# Patient Record
Sex: FEMALE | Race: ASIAN | Employment: FULL TIME | URBAN - METROPOLITAN AREA
[De-identification: names, ages, dates, MRNs, and addresses within clinical notes are randomized per-mention and may not be internally consistent; named-entity substitution may affect disease eponyms.]

---

## 2024-08-08 ENCOUNTER — HOSPITAL ENCOUNTER (OUTPATIENT)
Dept: GENERAL RADIOLOGY | Age: 59
Discharge: HOME | End: 2024-08-08
Attending: INTERNAL MEDICINE
Payer: COMMERCIAL

## 2024-08-08 ENCOUNTER — LAB ENCOUNTER (OUTPATIENT)
Dept: LAB | Age: 59
End: 2024-08-08
Attending: INTERNAL MEDICINE
Payer: COMMERCIAL

## 2024-08-08 ENCOUNTER — OFFICE VISIT (OUTPATIENT)
Dept: RHEUMATOLOGY | Facility: CLINIC | Age: 59
End: 2024-08-08
Payer: COMMERCIAL

## 2024-08-08 ENCOUNTER — TELEPHONE (OUTPATIENT)
Dept: RHEUMATOLOGY | Facility: CLINIC | Age: 59
End: 2024-08-08

## 2024-08-08 VITALS
WEIGHT: 133 LBS | DIASTOLIC BLOOD PRESSURE: 80 MMHG | SYSTOLIC BLOOD PRESSURE: 112 MMHG | BODY MASS INDEX: 23.27 KG/M2 | HEART RATE: 98 BPM | HEIGHT: 63.5 IN | TEMPERATURE: 98 F | OXYGEN SATURATION: 98 % | RESPIRATION RATE: 16 BRPM

## 2024-08-08 DIAGNOSIS — M1A.00X0 GOUTY ARTHROPATHY, CHRONIC, WITHOUT TOPHI: ICD-10-CM

## 2024-08-08 DIAGNOSIS — M1A.00X0 GOUTY ARTHROPATHY, CHRONIC, WITHOUT TOPHI: Primary | ICD-10-CM

## 2024-08-08 DIAGNOSIS — M15.0 PRIMARY OSTEOARTHRITIS INVOLVING MULTIPLE JOINTS: ICD-10-CM

## 2024-08-08 LAB
ALBUMIN SERPL-MCNC: 4.3 G/DL (ref 3.2–4.8)
ALBUMIN/GLOB SERPL: 1.2 {RATIO} (ref 1–2)
ALP LIVER SERPL-CCNC: 66 U/L
ALT SERPL-CCNC: 15 U/L
ANION GAP SERPL CALC-SCNC: 4 MMOL/L (ref 0–18)
AST SERPL-CCNC: 19 U/L (ref ?–34)
BILIRUB SERPL-MCNC: 0.5 MG/DL (ref 0.3–1.2)
BILIRUB UR QL STRIP.AUTO: NEGATIVE
BUN BLD-MCNC: 12 MG/DL (ref 9–23)
CALCIUM BLD-MCNC: 9.7 MG/DL (ref 8.7–10.4)
CHLORIDE SERPL-SCNC: 105 MMOL/L (ref 98–112)
CO2 SERPL-SCNC: 27 MMOL/L (ref 21–32)
COLOR UR AUTO: YELLOW
CREAT BLD-MCNC: 0.93 MG/DL
CRP SERPL-MCNC: 3.7 MG/DL (ref ?–0.5)
EGFRCR SERPLBLD CKD-EPI 2021: 71 ML/MIN/1.73M2 (ref 60–?)
ERYTHROCYTE [SEDIMENTATION RATE] IN BLOOD: 108 MM/HR
FASTING STATUS PATIENT QL REPORTED: NO
GLOBULIN PLAS-MCNC: 3.7 G/DL (ref 2–3.5)
GLUCOSE BLD-MCNC: 117 MG/DL (ref 70–99)
GLUCOSE UR STRIP.AUTO-MCNC: NORMAL MG/DL
IGA SERPL-MCNC: 362.3 MG/DL (ref 40–350)
IGM SERPL-MCNC: 89.4 MG/DL (ref 50–300)
IMMUNOGLOBULIN PNL SER-MCNC: 1696 MG/DL (ref 650–1600)
KETONES UR STRIP.AUTO-MCNC: NEGATIVE MG/DL
LEUKOCYTE ESTERASE UR QL STRIP.AUTO: 75
NITRITE UR QL STRIP.AUTO: NEGATIVE
OSMOLALITY SERPL CALC.SUM OF ELEC: 283 MOSM/KG (ref 275–295)
PH UR STRIP.AUTO: 5.5 [PH] (ref 5–8)
POTASSIUM SERPL-SCNC: 3.4 MMOL/L (ref 3.5–5.1)
PROT SERPL-MCNC: 8 G/DL (ref 5.7–8.2)
PROT UR STRIP.AUTO-MCNC: 20 MG/DL
RBC #/AREA URNS AUTO: >10 /HPF
RBC UR QL AUTO: NEGATIVE
RHEUMATOID FACT SERPL-ACNC: <3.5 IU/ML (ref ?–14)
SODIUM SERPL-SCNC: 136 MMOL/L (ref 136–145)
SP GR UR STRIP.AUTO: 1.03 (ref 1–1.03)
T4 FREE SERPL-MCNC: 1.8 NG/DL (ref 0.8–1.7)
TSI SER-ACNC: 0.4 MIU/ML (ref 0.55–4.78)
URATE SERPL-MCNC: 6.7 MG/DL
UROBILINOGEN UR STRIP.AUTO-MCNC: NORMAL MG/DL
VIT B12 SERPL-MCNC: 866 PG/ML (ref 211–911)

## 2024-08-08 PROCEDURE — 83521 IG LIGHT CHAINS FREE EACH: CPT

## 2024-08-08 PROCEDURE — 86225 DNA ANTIBODY NATIVE: CPT

## 2024-08-08 PROCEDURE — 73560 X-RAY EXAM OF KNEE 1 OR 2: CPT | Performed by: INTERNAL MEDICINE

## 2024-08-08 PROCEDURE — 73130 X-RAY EXAM OF HAND: CPT | Performed by: INTERNAL MEDICINE

## 2024-08-08 PROCEDURE — 73630 X-RAY EXAM OF FOOT: CPT | Performed by: INTERNAL MEDICINE

## 2024-08-08 PROCEDURE — 87522 HEPATITIS C REVRS TRNSCRPJ: CPT

## 2024-08-08 PROCEDURE — 84439 ASSAY OF FREE THYROXINE: CPT

## 2024-08-08 PROCEDURE — 87086 URINE CULTURE/COLONY COUNT: CPT

## 2024-08-08 PROCEDURE — 85652 RBC SED RATE AUTOMATED: CPT

## 2024-08-08 PROCEDURE — 84550 ASSAY OF BLOOD/URIC ACID: CPT

## 2024-08-08 PROCEDURE — 81001 URINALYSIS AUTO W/SCOPE: CPT

## 2024-08-08 PROCEDURE — 3079F DIAST BP 80-89 MM HG: CPT | Performed by: INTERNAL MEDICINE

## 2024-08-08 PROCEDURE — 86334 IMMUNOFIX E-PHORESIS SERUM: CPT

## 2024-08-08 PROCEDURE — 84443 ASSAY THYROID STIM HORMONE: CPT

## 2024-08-08 PROCEDURE — 86235 NUCLEAR ANTIGEN ANTIBODY: CPT

## 2024-08-08 PROCEDURE — 80053 COMPREHEN METABOLIC PANEL: CPT

## 2024-08-08 PROCEDURE — 84165 PROTEIN E-PHORESIS SERUM: CPT

## 2024-08-08 PROCEDURE — 3074F SYST BP LT 130 MM HG: CPT | Performed by: INTERNAL MEDICINE

## 2024-08-08 PROCEDURE — 86140 C-REACTIVE PROTEIN: CPT

## 2024-08-08 PROCEDURE — 87077 CULTURE AEROBIC IDENTIFY: CPT

## 2024-08-08 PROCEDURE — 3008F BODY MASS INDEX DOCD: CPT | Performed by: INTERNAL MEDICINE

## 2024-08-08 PROCEDURE — 86431 RHEUMATOID FACTOR QUANT: CPT

## 2024-08-08 PROCEDURE — 82784 ASSAY IGA/IGD/IGG/IGM EACH: CPT

## 2024-08-08 PROCEDURE — 36415 COLL VENOUS BLD VENIPUNCTURE: CPT

## 2024-08-08 PROCEDURE — 82607 VITAMIN B-12: CPT

## 2024-08-08 PROCEDURE — 87186 SC STD MICRODIL/AGAR DIL: CPT

## 2024-08-08 PROCEDURE — 99205 OFFICE O/P NEW HI 60 MIN: CPT | Performed by: INTERNAL MEDICINE

## 2024-08-08 RX ORDER — METHYLPREDNISOLONE 4 MG/1
TABLET ORAL
Qty: 1 EACH | Refills: 0 | Status: SHIPPED | OUTPATIENT
Start: 2024-08-08

## 2024-08-08 RX ORDER — ALLOPURINOL 100 MG/1
100 TABLET ORAL DAILY
COMMUNITY
Start: 2023-04-14

## 2024-08-08 RX ORDER — BRINZOLAMIDE/BRIMONIDINE TARTRATE 10; 2 MG/ML; MG/ML
1 SUSPENSION/ DROPS OPHTHALMIC 2 TIMES DAILY
COMMUNITY
Start: 2024-03-27

## 2024-08-08 RX ORDER — BIMATOPROST 0.3 MG/ML
SOLUTION/ DROPS OPHTHALMIC NIGHTLY
COMMUNITY
Start: 2024-05-03

## 2024-08-08 RX ORDER — AMLODIPINE BESYLATE 5 MG/1
5 TABLET ORAL DAILY
COMMUNITY

## 2024-08-08 RX ORDER — ALLOPURINOL 100 MG/1
100 TABLET ORAL DAILY
Qty: 30 TABLET | Refills: 1 | Status: SHIPPED | OUTPATIENT
Start: 2024-08-08

## 2024-08-08 RX ORDER — OLMESARTAN MEDOXOMIL 20 MG/1
20 TABLET ORAL DAILY
COMMUNITY

## 2024-08-08 NOTE — PROGRESS NOTES
[facility], [address] [place] [address]      Consult     Talon Telles Patient Status:  No patient class for patient encounter    1965 MRN [id number]   Location [facility], [address] [place] [address] Attending No att. providers found   Hosp Day # 0 PCP Kenneth Pabon MD     Referring Provider: PCP    Reason for Consultation:   Component  Ref Range & Units 22  9:39 AM   Uric Acid  2.3 - 6.6 mg/dL 8.8 High    Comment: Patients treated with          Subjective:      Talon Telles is a 58 year old female with around 15-20 years ago noticed slow progression of arthritic changes tips of DIP joints; stiffness pain     Has occasional swelling of hands (over use)     Patient was started on allopurinol 100mg 2 years ago but does not take every only with flareups.    Had recent swelling right ankle and alternates with left ankle. (Now consistently for 2 doses)    No colchicine; no oral steroids     Denies any pain in her shoulders, elbows , wrists or hands    Has issues with right knee pain (saw ortho and did cortisone shot; 2019)     2 weeks ago ; Had colonoscopy had perforation (polyp)     Colon perforation ([facility]) Dr Wells (did the procedure)     Dr Epstein did surgeon (took out some colon) and some appendix (ascending colon poly removed no cancer)     Buts Gejers syndrome (yearly colonscopy)     Occasional low back; hip pain neck    Denies any history of DVT PE TIA CVA seizures migraines or headaches    States no shortness of breath ,chest pain ,fevers ,chills    States no urinary or bowel symptoms; bloody stools    States no headaches jaw pain, vision changes    States no history of pericardial, pleural effusions    States no significant dry eyes or dry mouth (mild)     States no history of uveitis iritis scleritis    States no history of Raynaud's or digital ulcerations    States no major weight changes; night sweats    Her weight has been stable    States no history of  photosensitive or malar rash.    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     Takes NSAIDS sometimes now just tylenol ; supportive ; 25 year old daughter stays with them. (Working from home)       History/Other:      Past Medical History:History reviewed. No pertinent past medical history.     Past Surgical History:   Past Surgical History:   Procedure Laterality Date    Excis tendon sheath lesn,hand/fingr Left 11/10/2015    Procedure: EXCISION MASS/CYST  FINGER;  Surgeon: Jesus Myles MD;  Location: [facility]    Revise median n/carpal tunnel surg Left 11/10/2015    Procedure: CARPAL TUNNEL RELEASE;  Surgeon: Jesus Myles MD;  Location: [facility]       Social History:  reports that she has never smoked. She has never used smokeless tobacco. She reports that she does not currently use alcohol. She reports that she does not use drugs.    Family History: History reviewed. No pertinent family history.    Allergies:   Allergies   Allergen Reactions    Hydrocodone      Side effect only, causes nausea pt prefers not to have.       Current Medications:  Current Outpatient Medications   Medication Sig Dispense Refill    amLODIPine 5 MG Oral Tab Take 1 tablet (5 mg total) by mouth daily.      Bimatoprost 0.03 % Ophthalmic Solution Place into both eyes nightly.      SIMBRINZA 1-0.2 % Ophthalmic Suspension 1 drop by Each eye route 2 (two) times daily.      olmesartan 20 MG Oral Tab Take 1 tablet (20 mg total) by mouth daily.      allopurinol 100 MG Oral Tab Take 1 tablet (100 mg total) by mouth daily.      allopurinol 100 MG Oral Tab Take 1 tablet (100 mg total) by mouth daily. 30 tablet 1    methylPREDNISolone (MEDROL) 4 MG Oral Tablet Therapy Pack As directed. 1 each 0    Metoprolol Succinate ER (TOPROL XL) 100 MG Oral Tablet 24 Hr Take 1 tablet (100 mg total) by mouth  daily.      B Complex-C (SUPER B COMPLEX OR) Take  by mouth.      MELATONIN OR Take  by mouth.      Ergocalciferol (VITAMIN D OR) Take  by mouth.        No current facility-administered medications for this visit.     (Not in a hospital admission)      Review of Systems:     Constitutional: Negative for chills, , fatigue, fever and unexpected weight change.    HENT: Negative for congestion, and mouth sores.    Eyes: Negative for photophobia, pain, redness and visual disturbance.    Respiratory: Negative for apnea, cough, chest tightness, shortness of breath, wheezing and stridor.    Cardiovascular: Negative for chest pain, palpitations and leg swelling.    Gastrointestinal: Negative for abdominal distention, abdominal pain, blood in stool, constipation, diarrhea and nausea.    Endocrine: Negative.     Genitourinary: Negative for decreased urine volume, difficulty urinating, dyspareunia, dysuria, flank pain, and frequency.    Musculoskeletal: + arthralgias, no gait problem and joint swelling.    Skin: Negative for color change, pallor and rash. No raynauds or digital ulcerations no sclerodactly.    Allergic/Immunologic: Negative.    Neurological: Negative for dizziness, tremors, seizures, syncope, speech difficulty, weakness, light-headedness, numbness and headaches.    Hematological: Does not bruise/bleed easily.    Psychiatric/Behavioral: Negative for confusion, decreased concentration, hallucinations, self-injury, +sleep disturbance and no suicidal ideas or depression.    Objective:   Vitals:    08/08/24 1116   BP: 112/80   Pulse: 98   Resp: 16   Temp: 98.4 °F (36.9 °C)          Constitutional: is oriented to person, place, and time. Appears well-developed and well-nourished. No distress.    HEENT: Normocephalic; EOMI; no jvd; no LAD; no oral or nasal ulcers.     Eyes: Conjunctivae and EOM are normal. Pupils are equal, round, and reactive to light.     Neck: Normal range of motion. No thyromegaly  present.    Cardiovascular: RRR, no murmurs.    Lungs: Clear, Bilateral air entry, no wheezes.    Abdominal: Soft.    Musculoskeletal:         Joint Exam 08/08/2024        Right  Left   Knee   Tender   Tender   Ankle  Swollen Tender      Subtalar  Swollen Tender      Tarsometatarsal  Swollen Tender      MTP 1  Swollen       MTP 2  Swollen       MTP 3  Swollen       MTP 4  Swollen            Swollen: 7      Tender: 5          Right shoulder: Exhibits normal range of motion on abduction and internal rotation, no tenderness, no bony tenderness, no deformity, no laceration, no pain and no spasm.        Left shoulder: Exhibits normal range of motion on abduction and internal and external rotation.  no tenderness, no bony tenderness, no swelling, no effusion, no deformity, no pain, no spasm and normal strength.        Right elbow:  Exhibits normal range of motion, no swelling, no effusion and no deformity. No tenderness found. No medial epicondyle, no lateral epicondyle and no olecranon process tenderness noted. There are no contractures or tophi or nodules.        Left elbow:  Normal range of motion, no swelling, no effusion and no deformity. No medial epicondyle, no lateral epicondyle and no olecranon process tenderness noted. There are no contractures or tophi or nodules.        Right wrist:  Exhibits normal range of motion, no tenderness, no bony tenderness, no swelling, no effusion and no crepitus. Flexion and extension intact w/o limitation.        Left wrist: Exhibits normal range of motion, no tenderness, no bony tenderness, no swelling, no effusion, no crepitus and no deformity. Flexion and extension intact without limitation.        Right hip: Exhibits normal range of motion, normal strength, no tenderness, no bony tenderness, no swelling and no crepitus.        Right hand: No synovitis of MCP,PIP or DIP joints; there are small Bouchards extensive scattered enlarged Heberden nodules noted flexion deformities;   strength: 100%.  Mild squaring first CMC joint        Left hand: No synovitis of MCP,PIP or DIP joints; no Bouchards extensive enlarged Heberden nodules noted with flexion deformities at the DIP joint;  strength: 100%.  Mild squaring of first CMC joint joint        Left hip: Exhibits normal range of motion, normal strength, no tenderness, no bony tenderness, No swelling and no crepitus.        Right knee: Exhibits normal range of motion, no swelling, no effusion, no ecchymosis, no deformity and no erythema. No tenderness found. No medial joint line, no lateral joint line, no MCL and no LCL tenderness noted. mod crepitation on flexion of knee and extension normal.        Left knee:  Exhibits normal range of motion, no swelling, no effusion, no ecchymosis and no erythema. No tenderness found. No medial joint line, no lateral joint line and no patellar tendon tenderness noted. mod crepitation on flexion of the knee. Extension intact and normal.        Right ankle: Moderate soft tissue swelling, mild warmth limitation range of motion flexion plantarflexion because of the swelling and edema no deformity. +tenderness.         Left ankle: Exhibits no swelling. No tenderness. No lateral malleolus and no medial malleolus tenderness found. Achilles tendon normal. Achilles tendon exhibits no pain, no defect and normal Renteria's test results.  Dorsiflexion and plantar flexion intact without limitation in range of motion.        Cervical back: Exhibits normal range of motion, no tenderness, no bony tenderness, no swelling, no pain and no spasm.        Thoracic back: Exhibits normal range of motion, no tenderness, no bony tenderness and no spasm.        Lumbar back:  Exhibits normal range of motion, no tenderness, no bony tenderness, no pain and no spasm.        Right foot: normal. There is normal range of motion, no tenderness, no bony tenderness, no crepitus and no laceration. There is no synovitis or tenderness of  the MTP joints to palpation.  Bony enlargement of the MTP joints with mild soft tissue swelling        Left foot: normal. There is normal range of motion, no tenderness, no bony tenderness and no crepitus. There is no synovitis or tenderness of the MTP joints to palpation.  Bony enlargement of the MTP joints.  Bony enlargement MTP joints    Lymphadenopathy: No submental, no submandibular, and no occipital adenopathy present, has no cervical adenopathy or axillary lympadenopathy.    Neurological: Alert and oriented. No focal motor or sensory abnormalities. Strength is 5/5 Upper Extremities/Lower Extremities proximally and distally.    Skin: Skin is warm, dry and intact.  No rashes no purpuric petechial lesion    Psychiatric: Normal behavior.    Results:    Labs:      No results found for: \"WBC\", \"RBC\", \"HGB\", \"HCT\", \"MCV\", \"MCH\", \"MCHC\", \"RDW\", \"PLT\", \"MPV\", \"LYMPHOCYTES\", \"NEUT\"    No components found for: \"RELY\", \"NMET\", \"MYEL\", \"PROMY\", \"MARYAM\", \"ABSNEUTS\", \"ABSBANDS\", \"ABMM\", \"ABMY\", \"ABPM\", \"ABBL\"      No results found for: \"NA\", \"K\", \"CHLORIDE\", \"CO2\", \"BUN\", \"CREAT\", \"GFR\", \"ALB\", \"ALKPHOS\", \"AST\", \"ALT\"       No components found for: \"ESRWESTERGRN\"       No results found for: \"CRP\"      No results found for: \"COLOR\", \"CLARITY\", \"UROBILINOGEN\", \"YEAST\"  @LABRCNTIP(RF,B12)@      [unfilled]    Imaging:  No results found.    Anatomical Region Laterality Modality   Lower Extremities right Digital Radiography   Knee -- --     Narrative    CLINICAL INDICATION: PAIN IN RT KNEE  COMPARISON: None  TECHNIQUE: Bilateral AP standing, PA skiers, and patellar sundown view of the  knees were obtained. A single lateral view of the right knee was also performed  FINDINGS:  Right knee: There is no acute fracture. No dislocation. Alignment is  satisfactory. There is prominent bony spurring along the superior margin of the  patellar facet. Otherwise Minimal bony spurring most prominent in the medial  compartment. Cortex is  intact. Patella is not subluxed. Small suprapatellar  joint effusion. No intramedullary abnormality. Lateral patellofemoral joint  space narrowing and bony spurring is also present.  Left knee: There is no acute fracture. No dislocation. Minimal bony spurring  most prominent in the medial compartment. Cortex is intact. No intramedullary  abnormality. Bony spurring along the lateral patellar facet.  IMPRESSION:  1.  No acute osseous abnormality within bilateral knees.  2.  Mild degenerative changes within the right knee and minimal degenerative  changes of the left knee.  Dictating Vivian Holly  Dictated 2022 14:45  Signing Vivian Holly  Left ankle 3 views and left foot 3 views.  HISTORY: Left foot and ankle pain. M 25.572.  Opinion:  No acute appearing fracture or bony lesion. No dislocation. Spurs are seen at  the plantar and posterior aspect of the calcaneus. Hallux valgus deformity is  noted with mild spurring and joint space loss at the MP joint of the great toe.  Dictating Quan Bray  Dictated 2021 15:07  Signing Quan Bray  [place] [address]                      Transcribed by:  ZULEMA                                       21 15:09  Signed by:  QUAN ACOSTA MD                          21 15:09                                     [facility] [address] [place], [place]    NAME: VINH GLEZ/AGE/SEX: 1965 - 53 - F    PHYSICIAN: Kenneth Pabon M.D.                                    ADMIT DATE:    UNIT #: [id number]                                                DIS DATE:    ACCT #: [id number]                                              LOC/RM/BED: D0O-                                                [facility]                                      RAD/XR  BONE DENSITY : [id number]                                           ORDER DATE: 03/15/19                                           REPORT # : [id number]    Exam: Bone Densitometry        Indication: Postmenopausal screening.        Comparison: None.        Technical Factors: Utilizing dual-isotope imaging technique the spine and hip were evaluated.        Findings:  On AP projection of the lumbar spine the bone mineral density is 1.082g/sq cm  corresponding with a T-score value of 0.3. This is within normal limits and there is no increased  fracture risk.        In the femoral neck the bone mineral density is 0.792 g/sq cm corresponding with a T-score value of  -0.5. This is also within normal limits and there is no increased fracture risk.        Impression:        Bone mineral density is within normal limits and there is no increased fracture risk.        DICTATED: Wei Marx M.D.      <Electronically signed by Wei Marx M.D. in OV>          Wei Marx M.D.          03/15/19 1540    DRAFT UNTIL SIGNED     AMINATION: MRI of the right foot without IV contrast   CLINICAL INDICATION: Suspect fifth metatarsal stress fracture   COMPARISON: 12/19/2018 left foot radiograph   MRI of the right foot was performed with sagittal T1 and STIR sequences and   axial and coronal T2 fat-saturated and T1-weighted sequences.   FINDINGS: There is bone marrow edema surrounding the nondisplaced,   intra-articular fracture of the lateral aspect of the second metatarsal base.   There is soft tissue edema in the Lisfranc joint; however, the Lisfranc ligament   is not disrupted. There is surrounding soft tissue edema. The flexor and   extensor tendons are intact. The abductor hallucis and abductor digit minimi   tendons are intact.   There are bone marrow contusions in the third and fourth metatarsal bases as   well as the intermediate and lateral cuneiforms.   IMPRESSION: Bone marrow contusions along the second through fourth    tarsometatarsal articulation and nondisplaced intra-articular fracture of the   second metatarsal base. Correlation with CT scan would be helpful for further   evaluation.   Dictating Ramy Pierre   Dictated 12/31/2018 16:48   Signing Ramy Pierre   Location [place] [address]                          Component  Ref Range & Units 4/4/24 10:06 AM   Sodium  133 - 144 mEq/L 140   Potassium  3.5 - 5.2 mEq/L 4.0   Chloride  98 - 107 mEq/L 105   Glucose  70 - 99 mg/dL 109 High    BUN  7 - 25 mg/dL 14   Creatinine  0.6 - 1.2 mg/dL 0.8   Calcium  8.6 - 10.3 mg/dL 9.1   Total Protein  6.4 - 8.9 g/dL 6.9   Albumin  3.5 - 5.7 g/dL 3.9   Total Bilirubin  0.3 - 1.0 mg/dL 1.0   Alkaline Phosphatase  34 - 104 U/L 60   AST  13 - 39 U/L 12 Low    CO2  21.0 - 31 mEq/L 26.3   ALT  7 - 52 U/L 12   Anion Gap  6.2 - 14.7 mEq/L 8.7   Resulting Agency [facility]     ef Range & Units 4/4/24 10:06 AM   Vit D, 25-Hydroxy  30.0 - 100 ng/mL 43.8     ef Range & Units 12/1/23 11:36 AM   WBC  4.00 - 11 10*3/uL 6.20   RBC  3.63 - 5.0 10*6/uL 5.11 High    Hgb  12.0 - 15 g/dL 14.9   Hematocrit  34.7 - 45 % 46.3 High    MCV  80.0 - 100 fL 90.7   MCH  26.0 - 34 pg 29.1   MCHC  32.5 - 35 g/dL 32.1 Low    RDW  11.9 - 15 % 13.6   Platelets  150 - 450 10*3/uL 252   MPV  6.8 - 10.2 fL 8.8   Neutrophils %  43.0 - 82 % 51.7   Lymphocytes %  14.5 - 45 % 36.1   Monocytes %  4.3 - 13.3 % 6.6   Eosinophils %  0.1 - 6.8 % 4.8   Basophils %  0.0 - 2.0 % 0.9   ABSOLUTE NEUTROPHIL COUNT  1.7 - 7.7 10*3/uL 3.2   ABSOLUTE NEUTROPHIL COUNT  /uL 3,205   ABSOLUTE LYMPHOCYTES  0.6 - 3.4 10*3/uL 2.2   Monocytes Absolute  0.3 - 1.0 10*3/uL 0.4   Eosinophils Absolute  0.0 - 0.5 10*3/uL 0.3   Basophils Absolute  0.0 - 0.2 10*3/uL 0.1   Resulting Agency [facility]     Specimen Collected: 12/01/23 11:36 AM     go2 media Results Release      Assessment & Plan:      58-year-old woman comes in for further evaluation for likely gouty  arthritis right ankle swelling 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  Discussed the importance of remaining on allopurinol consistently for goal uric acid level less than 6  Consider colchicine but will try Medrol Dosepak first  Will see patient back in 4 weeks to repeat CMP uric acid levels and reevaluate the ankle.  If it is persistently swollen still we will proceed with an MRI hold for now    Patient agrees with this plan.  Will also get complete autoimmune workup and also get a new baseline DEXA scan    Discussed steroids and allopurinol discussed.    Education and counseling provided to patient.  Instructed patient to call my office or seek medical attention immediately if symptoms worsen. Risks and side effects of medications and diagnosis discussed in detail and patient was given written information on new prescribed medications.    Return to clinic:  Return in about 4 weeks (around 9/5/2024).    Nedra Delgado MD  8/8/2024

## 2024-08-08 NOTE — TELEPHONE ENCOUNTER
X-rays show changes of osteoarthritis and tendinitis of the Achilles.  We will follow-up with the labs and let her know if any other things come back concerning

## 2024-08-08 NOTE — PATIENT INSTRUCTIONS
OSTEOARTHRITIS    Fast Facts    Though some of the joint changes are irreversible, most patients will not need joint replacement surgery.    OA symptoms (what you feel) can vary greatly among patients.    A rheumatologist can detect arthritis and prescribe the proper treatment. The goal of treatment in OA is to reduce pain and improve function.    Exercise is an important part of OA treatment, because it can decrease joint pain and improve function.    At present, there is no treatment that can reverse the damage of OA in the joints. Researchers are trying to find ways to slow or reverse this joint damage.    Osteoarthritis (also known as OA) is a common joint disease that most often affects middle-age to elderly people. It is commonly referred to as \"wear and tear\" of the joints, but we now know that OA is a disease of the entire joint, involving the cartilage, joint lining, ligaments, and bone. Although it is more common in older people, it is not really accurate to say that the joints are just \"wearing out.\" It is characterized by breakdown of the cartilage (the tissue that cushions the ends of the bones between joints), bony changes of the joints, deterioration of tendons and ligaments, and various degrees of inflammation of the joint lining (called the synovium).    This arthritis tends to occur in the hand joints, spine, hips, knees, and great toes. The lifetime risk of developing OA of the knee is about 46%, and the lifetime risk of developing OA of the hip is 25%, according to the [facility] Osteoarthritis Project, a long-term study from the [facility] and sponsored by the Centers for Disease Control and Prevention (often called the CDC) and the National Institutes of Health.    OA is a top cause of disability in older people. The goal of osteoarthritis treatment is to reduce pain and improve function. There is no cure for the disease, but some treatments attempt to slow disease  progression.         What is osteoarthritis?    OA is a frequently slowly progressive joint disease typically seen in middle-aged to elderly people. In osteoarthritis, the cartilage between the bones in the joint breaks down. This causes the affected bones to slowly get bigger. The joint cartilage often breaks down because of mechanical stress or biochemical changes within the body, causing the bone underneath to fail. OA can occur together with other types of arthritis, such as gout or rheumatoid arthritis.    OA tends to affect commonly used joints such as the hands and spine, and the weight-bearing joints such as the hips and knees. Symptoms include:    Joint pain and stiffness    Knobby swelling at the joint    Cracking or grinding noise with joint movement    Decreased function of the joint    How do you treat osteoarthritis?    There is no proven treatment yet that can reverse joint damage from OA. The goal of osteoarthritis treatment is to reduce pain and improve function of the affected joints. Most often, this is possible with a mixture of physical measures and drug therapy and, sometimes, surgery.    Physical measures: Weight loss and exercise are useful in OA. Excess weight puts stress on your knee joints and hips and low back. For every 10 pounds of weight you lose over 10 years, you can reduce the chance of developing knee OA by up to 50 percent. Exercise can improve your muscle strength, decrease joint pain and stiffness, and lower the chance of disability due to OA. Also helpful are support (\"assistive\") devices, such as orthotics or a walking cane, that help you do daily activities. Heat or cold therapy can help relieve OA symptoms for a short time.    Certain alternative treatments such as spa (hot tub), massage, and chiropractic manipulation can help relieve pain for a short time. They can be costly, though, and require repeated treatments. Also, the long-term benefits of these alternative  (sometimes called complementary or integrative) medicine treatments are unproven but are under study.    Drug therapy: Forms of drug therapy include topical, oral (by mouth) and injections (shots). You apply topical drugs directly on the skin over the affected joints. These medicines include capsaicin cream, lidocaine and diclofenac gel. Oral pain relievers such as acetaminophen are common first treatments. So are nonsteroidal anti-inflammatory drugs (often called NSAIDs), which decrease swelling and pain.    In 2010, the government (FDA) approved the use of duloxetine (Cymbalta) for chronic (long-term) musculoskeletal pain including from OA. This oral drug is not new. It also is in use for other health concerns, such as mood disorders, nerve pain and fibromyalgia.    Patients with more serious pain may need stronger medications, such as prescription narcotics.    Joint injections with corticosteroids (sometimes called cortisone shots) or with a form of lubricant called hyaluronic acid can give months of pain relief from OA. This lubricant is given in the knee, and these shots may help delay the need for a knee replacement by a few years in some patients.    Surgery: Surgical treatment becomes an option for severe cases. This includes when the joint has serious damage, or when medical treatment fails to relieve pain and you have major loss of function. Surgery may involve arthroscopy, repair of the joint done through small incisions (cuts). If the joint damage cannot be repaired, you may need a joint replacement.    Supplements: Many over-the-counter nutrition supplements have been used for osteoarthritis treatment. Most lack good research data to support their effectiveness and safety. Among the most widely used are calcium, vitamin D and omega-3 fatty acids. To ensure safety and avoid drug interactions, consult your doctor or pharmacist before using any of these supplements. This is especially true when you are  combining these supplements with prescribed

## 2024-08-09 LAB
DSDNA IGG SERPL IA-ACNC: 1.1 IU/ML
ENA RNP IGG SER IA-ACNC: 1.9 U/ML
ENA SM IGG SER IA-ACNC: <0.7 U/ML
U1 SNRNP IGG SER IA-ACNC: 1.8 U/ML

## 2024-08-09 NOTE — TELEPHONE ENCOUNTER
Left detailed message on patient's voicemail regarding the below message:    X-rays show changes of osteoarthritis and tendinitis of the Achilles.  We will follow-up with the labs and let her know if any other things come back concerning     Patient was asked to call the office back with any questions she may have.

## 2024-08-12 ENCOUNTER — TELEPHONE (OUTPATIENT)
Dept: RHEUMATOLOGY | Facility: CLINIC | Age: 59
End: 2024-08-12

## 2024-08-12 DIAGNOSIS — N39.0 URINARY TRACT INFECTION WITHOUT HEMATURIA, SITE UNSPECIFIED: Primary | ICD-10-CM

## 2024-08-12 LAB
KAPPA LC FREE SER-MCNC: 3.96 MG/DL (ref 0.33–1.94)
KAPPA LC FREE/LAMBDA FREE SER NEPH: 1.18 {RATIO} (ref 0.26–1.65)
LAMBDA LC FREE SERPL-MCNC: 3.35 MG/DL (ref 0.57–2.63)

## 2024-08-12 RX ORDER — CIPROFLOXACIN 250 MG/1
250 TABLET, FILM COATED ORAL 2 TIMES DAILY
Qty: 10 TABLET | Refills: 0 | Status: SHIPPED | OUTPATIENT
Start: 2024-08-12 | End: 2024-08-17

## 2024-08-12 NOTE — TELEPHONE ENCOUNTER
Left  for pt. to call office and informed that Cipro has been ordered and sent to [facility].

## 2024-08-12 NOTE — TELEPHONE ENCOUNTER
0 Result Notes  URINE CULTURE 10,000 - 50,000 CFU/ML Escherichia coli Abnormal            Resulting Agency: [facility] ([facility])     Susceptibility     Escherichia coli     Not Specified     Ampicillin <=2 Sensitive     Cefazolin <=4 Sensitive     Ciprofloxacin <=0.25 Sensitive     Gentamicin <=1 Sensitive     Levofloxacin 1 Sensitive     Meropenem <=0.25 Sensitive     Nitrofurantoin <=16 Sensitive     Piperacillin + Tazobactam <=4 Sensitive     Trimethoprim/Sulfa <=20 Sensitive              Patient's urine culture is greater than 50,000 E. Coli    Could explain her inflammation levels being elevated along with her gout    It is sensitive to Cipro    Would suggest to 250 twice a day of Cipro for 5 days repeat urinalysis urine culture through PCP.  If no allergies    Uric acid levels at 6.7 likely higher because of gout flare same plan with allopurinol and steroid pack unfortunately likely completed already despite the infection    Her TSH is quite abnormal    She likely has underlying thyroid issue which needs to be worked up through her PCP    Or endocrinology if she has 1    Other autoimmune testing is so far negative.  Will let her know as other things come back concerning    Please fax these labs to PCP

## 2024-08-16 LAB
ALBUMIN SERPL ELPH-MCNC: 3.43 G/DL (ref 3.75–5.21)
ALBUMIN/GLOB SERPL: 0.91 {RATIO} (ref 1–2)
ALPHA1 GLOB SERPL ELPH-MCNC: 0.33 G/DL (ref 0.19–0.46)
ALPHA2 GLOB SERPL ELPH-MCNC: 0.91 G/DL (ref 0.48–1.05)
B-GLOBULIN SERPL ELPH-MCNC: 0.85 G/DL (ref 0.68–1.23)
GAMMA GLOB SERPL ELPH-MCNC: 1.68 G/DL (ref 0.62–1.7)
PROT SERPL-MCNC: 7.2 G/DL (ref 5.7–8.2)

## 2024-09-04 ENCOUNTER — OFFICE VISIT (OUTPATIENT)
Dept: RHEUMATOLOGY | Facility: CLINIC | Age: 59
End: 2024-09-04
Payer: COMMERCIAL

## 2024-09-04 VITALS
SYSTOLIC BLOOD PRESSURE: 122 MMHG | HEIGHT: 63.5 IN | BODY MASS INDEX: 23.97 KG/M2 | OXYGEN SATURATION: 98 % | DIASTOLIC BLOOD PRESSURE: 80 MMHG | TEMPERATURE: 98 F | HEART RATE: 91 BPM | WEIGHT: 137 LBS | RESPIRATION RATE: 16 BRPM

## 2024-09-04 DIAGNOSIS — M1A.00X0 GOUTY ARTHROPATHY, CHRONIC, WITHOUT TOPHI: Primary | ICD-10-CM

## 2024-09-04 DIAGNOSIS — M15.0 PRIMARY OSTEOARTHRITIS INVOLVING MULTIPLE JOINTS: ICD-10-CM

## 2024-09-04 DIAGNOSIS — M25.471 RIGHT ANKLE SWELLING: ICD-10-CM

## 2024-09-04 DIAGNOSIS — M62.830 LUMBAR PARASPINAL MUSCLE SPASM: ICD-10-CM

## 2024-09-04 PROCEDURE — 3008F BODY MASS INDEX DOCD: CPT | Performed by: INTERNAL MEDICINE

## 2024-09-04 PROCEDURE — 3074F SYST BP LT 130 MM HG: CPT | Performed by: INTERNAL MEDICINE

## 2024-09-04 PROCEDURE — 3079F DIAST BP 80-89 MM HG: CPT | Performed by: INTERNAL MEDICINE

## 2024-09-04 PROCEDURE — 99215 OFFICE O/P EST HI 40 MIN: CPT | Performed by: INTERNAL MEDICINE

## 2024-09-04 NOTE — PROGRESS NOTES
[facility], [address] [place] [address]      Consult     Talon Prieto Patient Status:  No patient class for patient encounter    1965 MRN [id number]   Location [facility], [address] [place] [address] Attending No att. providers found   Hosp Day # 0 PCP Kenneth Pabon MD     Referring Provider: PCP    Reason for Consultation:   Component  Ref Range & Units 22  9:39 AM   Uric Acid  2.3 - 6.6 mg/dL 8.8 High    Comment: Patients treated with          Subjective:      Talon Prieto is a 58 year old female with around 15-20 years ago noticed slow progression of arthritic changes tips of DIP joints; stiffness pain     Has occasional swelling of hands (over use)     Patient was started on allopurinol 100mg 2 years ago but does not take every only with flareups.    Had recent swelling right ankle and alternates with left ankle. (Now consistently for 2 doses)    No colchicine; no oral steroids     Denies any pain in her shoulders, elbows , wrists or hands    Has issues with right knee pain (saw ortho and did cortisone shot; 2019)     2 weeks ago ; Had colonoscopy had perforation (polyp)     Colon perforation ([place]) Dr Wells (did the procedure)     Dr Epstein did surgeon (took out some colon) and some appendix (ascending colon poly removed no cancer)     Buts Gejers syndrome (yearly colonscopy)     Occasional low back; hip pain neck    Denies any history of DVT PE TIA CVA seizures migraines or headaches    States no shortness of breath ,chest pain ,fevers ,chills    States no urinary or bowel symptoms; bloody stools    States no headaches jaw pain, vision changes    States no history of pericardial, pleural effusions    States no significant dry eyes or dry mouth (mild)     States no history of uveitis iritis scleritis    States no history of Raynaud's or digital ulcerations    States no major weight changes; night sweats    Her weight has been stable    States no history of  photosensitive or malar rash.    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     Takes NSAIDS sometimes now just tylenol ; supportive ; 25 year old daughter stays with them. (Working from home)     Who was seen as a new patient August 8, 2024    Diagnosed with gouty arthritis started on allopurinol 100 mg daily with a Medrol Dosepak    Swelling improved significantly in his wrist hands and feet but after he finished the steroids he had recurrence of right ankle swelling and pain    Patient also noted to have elevated ESR of 100 but also had urine cultures that were positive and treated with antibiotics subsequently    We have discussed repeating sed rate CRP CMP and uric acid levels today to make sure we are at goal less than 6    And proceeding with an MRI of the right ankle with without contrast to rule out tear infection or other etiology or inflammatory etiology such as seronegative RA    Patient is open to the next steps and understands the potential diagnosis of both gout and seronegative rheumatoid arthritis or other etiology if there is a tear or other cause of the ankle swelling that is quite significant    Was given information today for further reading on methotrexate and Plaquenil anticipation of above    She does feel overall at least 60 to 70% better other than the right ankle pain and swelling    History/Other:      Past Medical History:History reviewed. No pertinent past medical history.     Past Surgical History:   Past Surgical History:   Procedure Laterality Date    Excis tendon sheath nandohand/fingr Left 11/10/2015    Procedure: EXCISION MASS/CYST  FINGER;  Surgeon: Jesus Myles MD;  Location: [facility]    Revise median n/carpal tunnel surg Left 11/10/2015    Procedure: CARPAL TUNNEL RELEASE;  Surgeon: Jesus Myles MD;  Location: [facility]        Social History:  reports that she has never smoked. She has never used smokeless tobacco. She reports that she does not currently use alcohol. She reports that she does not use drugs.    Family History: History reviewed. No pertinent family history.    Allergies:   Allergies   Allergen Reactions    Hydrocodone      Side effect only, causes nausea pt prefers not to have.       Current Medications:  Current Outpatient Medications   Medication Sig Dispense Refill    Omega-3 Fatty Acids (FISH OIL OR) Take 1 tablet by mouth daily.      amLODIPine 5 MG Oral Tab Take 1 tablet (5 mg total) by mouth daily.      Bimatoprost 0.03 % Ophthalmic Solution Place into both eyes nightly.      SIMBRINZA 1-0.2 % Ophthalmic Suspension 1 drop by Each eye route 2 (two) times daily.      allopurinol 100 MG Oral Tab Take 1 tablet (100 mg total) by mouth daily.      Metoprolol Succinate ER (TOPROL XL) 100 MG Oral Tablet 24 Hr Take 1 tablet (100 mg total) by mouth daily.      MELATONIN OR Take  by mouth.      Ergocalciferol (VITAMIN D OR) Take  by mouth.      olmesartan 20 MG Oral Tab Take 1 tablet (20 mg total) by mouth daily. (Patient not taking: Reported on 9/4/2024)      B Complex-C (SUPER B COMPLEX OR) Take  by mouth. (Patient not taking: Reported on 9/4/2024)        No current facility-administered medications for this visit.     (Not in a hospital admission)      Review of Systems:     Constitutional: Negative for chills, , fatigue, fever and unexpected weight change.    HENT: Negative for congestion, and mouth sores.    Eyes: Negative for photophobia, pain, redness and visual disturbance.    Respiratory: Negative for apnea, cough, chest tightness, shortness of breath, wheezing and stridor.    Cardiovascular: Negative for chest pain, palpitations and leg swelling.    Gastrointestinal: Negative for abdominal distention, abdominal pain, blood in stool, constipation, diarrhea and nausea.    Endocrine: Negative.     Genitourinary:  Negative for decreased urine volume, difficulty urinating, dyspareunia, dysuria, flank pain, and frequency.    Musculoskeletal: + arthralgias, no gait problem and joint swelling.    Skin: Negative for color change, pallor and rash. No raynauds or digital ulcerations no sclerodactly.    Allergic/Immunologic: Negative.    Neurological: Negative for dizziness, tremors, seizures, syncope, speech difficulty, weakness, light-headedness, numbness and headaches.    Hematological: Does not bruise/bleed easily.    Psychiatric/Behavioral: Negative for confusion, decreased concentration, hallucinations, self-injury, +sleep disturbance and no suicidal ideas or depression.    Objective:   Vitals:    09/04/24 1151   BP: 122/80   Pulse: 91   Resp: 16   Temp: 98.1 °F (36.7 °C)          Constitutional: is oriented to person, place, and time. Appears well-developed and well-nourished. No distress.    HEENT: Normocephalic; EOMI; no jvd; no LAD; no oral or nasal ulcers.     Eyes: Conjunctivae and EOM are normal. Pupils are equal, round, and reactive to light.     Neck: Normal range of motion. No thyromegaly present.    Cardiovascular: RRR, no murmurs.    Lungs: Clear, Bilateral air entry, no wheezes.    Abdominal: Soft.    Musculoskeletal:         Joint Exam 09/04/2024        Right  Left   Ankle  Swollen Tender      Subtalar  Swollen Tender           Swollen: 2      Tender: 2          Right shoulder: Exhibits normal range of motion on abduction and internal rotation, no tenderness, no bony tenderness, no deformity, no laceration, no pain and no spasm.        Left shoulder: Exhibits normal range of motion on abduction and internal and external rotation.  no tenderness, no bony tenderness, no swelling, no effusion, no deformity, no pain, no spasm and normal strength.        Right elbow:  Exhibits normal range of motion, no swelling, no effusion and no deformity. No tenderness found. No medial epicondyle, no lateral epicondyle and no  olecranon process tenderness noted. There are no contractures or tophi or nodules.        Left elbow:  Normal range of motion, no swelling, no effusion and no deformity. No medial epicondyle, no lateral epicondyle and no olecranon process tenderness noted. There are no contractures or tophi or nodules.        Right wrist:  Exhibits normal range of motion, no tenderness, no bony tenderness, no swelling, no effusion and no crepitus. Flexion and extension intact w/o limitation.        Left wrist: Exhibits normal range of motion, no tenderness, no bony tenderness, no swelling, no effusion, no crepitus and no deformity. Flexion and extension intact without limitation.        Right hip: Exhibits normal range of motion, normal strength, no tenderness, no bony tenderness, no swelling and no crepitus.        Right hand: No synovitis of MCP,PIP or DIP joints; there are small Bouchards extensive scattered enlarged Heberden nodules noted flexion deformities;  strength: 100%.  Mild squaring first CMC joint        Left hand: No synovitis of MCP,PIP or DIP joints; no Bouchards extensive enlarged Heberden nodules noted with flexion deformities at the DIP joint;  strength: 100%.  Mild squaring of first CMC joint joint        Left hip: Exhibits normal range of motion, normal strength, no tenderness, no bony tenderness, No swelling and no crepitus.        Right knee: Exhibits normal range of motion, no swelling, no effusion, no ecchymosis, no deformity and no erythema. No tenderness found. No medial joint line, no lateral joint line, no MCL and no LCL tenderness noted. mod crepitation on flexion of knee and extension normal.        Left knee:  Exhibits normal range of motion, no swelling, no effusion, no ecchymosis and no erythema. No tenderness found. No medial joint line, no lateral joint line and no patellar tendon tenderness noted. mod crepitation on flexion of the knee. Extension intact and normal.        Right ankle:  Moderate soft tissue swelling, mild warmth limitation range of motion flexion plantarflexion because of the swelling and edema no deformity. +tenderness.         Left ankle: Exhibits no swelling. No tenderness. No lateral malleolus and no medial malleolus tenderness found. Achilles tendon normal. Achilles tendon exhibits no pain, no defect and normal Renteria's test results.  Dorsiflexion and plantar flexion intact without limitation in range of motion.        Cervical back: Exhibits normal range of motion, no tenderness, no bony tenderness, no swelling, no pain and no spasm.        Thoracic back: Exhibits normal range of motion, no tenderness, no bony tenderness and no spasm.        Lumbar back:  Exhibits normal range of motion, no tenderness, no bony tenderness, no pain and + spasm.        Right foot: normal. There is normal range of motion, no tenderness, no bony tenderness, no crepitus and no laceration. There is no synovitis or tenderness of the MTP joints to palpation.  Bony enlargement of the MTP joints with mild soft tissue swelling        Left foot: normal. There is normal range of motion, no tenderness, no bony tenderness and no crepitus. There is no synovitis or tenderness of the MTP joints to palpation.  Bony enlargement of the MTP joints.  Bony enlargement MTP joints    Lymphadenopathy: No submental, no submandibular, and no occipital adenopathy present, has no cervical adenopathy or axillary lympadenopathy.    Neurological: Alert and oriented. No focal motor or sensory abnormalities. Strength is 5/5 Upper Extremities/Lower Extremities proximally and distally.    Skin: Skin is warm, dry and intact.  No rashes no purpuric petechial lesion    Psychiatric: Normal behavior.    Results:    Labs:      No results found for: \"WBC\", \"RBC\", \"HGB\", \"HCT\", \"MCV\", \"MCH\", \"MCHC\", \"RDW\", \"PLT\", \"MPV\", \"LYMPHOCYTES\", \"NEUT\"    No components found for: \"RELY\", \"NMET\", \"MYEL\", \"PROMY\", \"MARYAM\", \"ABSNEUTS\", \"ABSBANDS\",  \"ABMM\", \"ABMY\", \"ABPM\", \"ABBL\"      Lab Results   Component Value Date     08/08/2024    K 3.4 (L) 08/08/2024    CO2 27.0 08/08/2024    BUN 12 08/08/2024    ALB 4.3 08/08/2024    ALB 3.43 (L) 08/08/2024    AST 19 08/08/2024    ALT 15 08/08/2024          No components found for: \"ESRWESTERGRN\"       Lab Results   Component Value Date    CRP 3.70 (H) 08/08/2024         Lab Results   Component Value Date    CLARITY Turbid (A) 08/08/2024    UROBILINOGEN Normal 08/08/2024     @LABRCNTIP(RF,B12)@      [unfilled]    Imaging:  XR FOOT, COMPLETE (MIN 3 VIEWS), RIGHT (CPT=02870)    Result Date: 8/8/2024  CONCLUSION:    No acute fracture, or dislocation.  Midfoot DJD.  This is mild.  Minimal bunion and mild hallux valgus.  Mild degenerative changes at the metatarsophalangeal joint, 1st.  Macro both calcaneus  LOCATION:  [id number]   Dictated by (CST): Jesus Rose MD on 8/08/2024 at 2:47 PM     Finalized by (CST): Jesus Rose MD on 8/08/2024 at 2:48 PM       XR HAND (MIN 3 VIEWS), RIGHT (CPT=00130)    Result Date: 8/8/2024  CONCLUSION:    No acute fracture dislocation.  Osteoarthritic changes with joint space narrowing, sclerosis, osteophyte at the DIP joint of the 4th through 5th fingers and also the interphalangeal joint of the thumb.  Flexion likely chronic seen at the DIP joint 4th and 5th fingers especially the 5th finger.  Milder osteoarthritic changes thumb metacarpophalangeal joint and base of the thumb metacarpal.  No tissue measures.  LOCATION:  [id number]   Dictated by (CST): Jesus Rose MD on 8/08/2024 at 2:43 PM     Finalized by (CST): Jesus Rose MD on 8/08/2024 at 2:47 PM       XR HAND (MIN 3 VIEWS), LEFT (CPT=73130)    Result Date: 8/8/2024  CONCLUSION:    Osteoarthritic changes are seen, especially at the DIP joints of the 2nd, 4th and 5th fingers, where there is joint narrowing, sclerosis, osteophyte, at least moderate in degree, with milder similar changes at the DIP joint of the middle  finger.  Milder similar changes at the interphalangeal joint of the thumb.  Mild flexion seen at the DIP joint of the 4th and 5th fingers.  This appears chronic.  Minimal degenerative changes thumb metacarpophalangeal joint and base of the thumb metacarpal articulating with the lateral carpal bones.  No acute fractures seen.  No tissue calcifications.  LOCATION:  [id number]   Dictated by (CST): Jesus Rose MD on 8/08/2024 at 2:41 PM     Finalized by (CST): Jesus Rose MD on 8/08/2024 at 2:43 PM       XR FOOT, COMPLETE (MIN 3 VIEWS), LEFT (CPT=73630)    Result Date: 8/8/2024  CONCLUSION:    Plantar calcaneal spur, and spurring at the insertion of the Achilles tendon onto the calcaneus.  No acute fracture dislocation.  Mild-moderate midfoot DJD.  No sign of loss of the plantar arch.  Hallux valgus and bunion.  No calcifications.  LOCATION:  [id number]   Dictated by ([facility]): Jesus Rose MD on 8/08/2024 at 2:27 PM     Finalized by (CST): Jesus Rose MD on 8/08/2024 at 2:28 PM       XR KNEE (1 OR 2 VIEWS), RIGHT (CPT=73560)    Result Date: 8/8/2024  CONCLUSION:  Mild osteoarthritic changes are present. No acute fracture or other acute bony process.  LOCATION:  [id number]   Dictated by (CST): Jesus Rose MD on 8/08/2024 at 2:26 PM     Finalized by (CST): Jesus Rose MD on 8/08/2024 at 2:27 PM       XR KNEE (1 OR 2 VIEWS), LEFT (CPT=73560)    Result Date: 8/8/2024  CONCLUSION:  Minimal osteoarthritic changes are present. No acute fracture or other acute bony process.   LOCATION:  [id number]   Dictated by (CST): Jesus Rose MD on 8/08/2024 at 2:25 PM     Finalized by (CST): Jesus Rose MD on 8/08/2024 at 2:26 PM        Anatomical Region Laterality Modality   Lower Extremities right Digital Radiography   Knee -- --     Narrative    CLINICAL INDICATION: PAIN IN RT KNEE  COMPARISON: None  TECHNIQUE: Bilateral AP standing, PA skiers, and patellar sundown view of the  knees were obtained. A single lateral view of  the right knee was also performed  FINDINGS:  Right knee: There is no acute fracture. No dislocation. Alignment is  satisfactory. There is prominent bony spurring along the superior margin of the  patellar facet. Otherwise Minimal bony spurring most prominent in the medial  compartment. Cortex is intact. Patella is not subluxed. Small suprapatellar  joint effusion. No intramedullary abnormality. Lateral patellofemoral joint  space narrowing and bony spurring is also present.  Left knee: There is no acute fracture. No dislocation. Minimal bony spurring  most prominent in the medial compartment. Cortex is intact. No intramedullary  abnormality. Bony spurring along the lateral patellar facet.  IMPRESSION:  1.  No acute osseous abnormality within bilateral knees.  2.  Mild degenerative changes within the right knee and minimal degenerative  changes of the left knee.  Dictating Vivian Holly  Dictated 2022 14:45  Signing Vivian Holly  Left ankle 3 views and left foot 3 views.  HISTORY: Left foot and ankle pain. M 25.572.  Opinion:  No acute appearing fracture or bony lesion. No dislocation. Spurs are seen at  the plantar and posterior aspect of the calcaneus. Hallux valgus deformity is  noted with mild spurring and joint space loss at the MP joint of the great toe.  Dictating Quan Bray  Dictated 2021 15:07  Signing Quan Bray  [place] [id number]                      Transcribed by:  ZULEMA                                       21 15:09  Signed by:  QUAN ACOSTA MD                          21 15:09                                     [facility] [address] [place], [place]    NAME: DEASIS,VINH SANTANA/AGE/SEX: 1965 - 53 - F    PHYSICIAN: Kenneth Pabon M.D.                                     ADMIT DATE:    UNIT #: [id number]                                                DIS DATE:    ACCT #: [id number]                                              LOC//BED: [id number]                                                [facility]                                      RAD/XR BONE DENSITY : [id number]                                           ORDER DATE: 03/15/19                                           REPORT # : [id number]    Exam: Bone Densitometry        Indication: Postmenopausal screening.        Comparison: None.        Technical Factors: Utilizing dual-isotope imaging technique the spine and hip were evaluated.        Findings:  On AP projection of the lumbar spine the bone mineral density is 1.082g/sq cm  corresponding with a T-score value of 0.3. This is within normal limits and there is no increased  fracture risk.        In the femoral neck the bone mineral density is 0.792 g/sq cm corresponding with a T-score value of  -0.5. This is also within normal limits and there is no increased fracture risk.        Impression:        Bone mineral density is within normal limits and there is no increased fracture risk.        DICTATED: Wei Marx M.D.      <Electronically signed by Wei Marx M.D. in OV>          Wei Marx M.D.          03/15/19 1540    DRAFT UNTIL SIGNED     AMINATION: MRI of the right foot without IV contrast   CLINICAL INDICATION: Suspect fifth metatarsal stress fracture   COMPARISON: 12/19/2018 left foot radiograph   MRI of the right foot was performed with sagittal T1 and STIR sequences and   axial and coronal T2 fat-saturated and T1-weighted sequences.   FINDINGS: There is bone marrow edema surrounding the nondisplaced,   intra-articular fracture of the lateral aspect of the second metatarsal base.   There is soft tissue edema in the Lisfranc joint; however, the Lisfranc ligament   is not disrupted. There is surrounding soft tissue edema. The flexor and    extensor tendons are intact. The abductor hallucis and abductor digit minimi   tendons are intact.   There are bone marrow contusions in the third and fourth metatarsal bases as   well as the intermediate and lateral cuneiforms.   IMPRESSION: Bone marrow contusions along the second through fourth   tarsometatarsal articulation and nondisplaced intra-articular fracture of the   second metatarsal base. Correlation with CT scan would be helpful for further   evaluation.   Dictating Ramy Pierre   Dictated 12/31/2018 16:48   Signing Ramy Pierre   Location [place] [address]                          Component  Ref Range & Units 4/4/24 10:06 AM   Sodium  133 - 144 mEq/L 140   Potassium  3.5 - 5.2 mEq/L 4.0   Chloride  98 - 107 mEq/L 105   Glucose  70 - 99 mg/dL 109 High    BUN  7 - 25 mg/dL 14   Creatinine  0.6 - 1.2 mg/dL 0.8   Calcium  8.6 - 10.3 mg/dL 9.1   Total Protein  6.4 - 8.9 g/dL 6.9   Albumin  3.5 - 5.7 g/dL 3.9   Total Bilirubin  0.3 - 1.0 mg/dL 1.0   Alkaline Phosphatase  34 - 104 U/L 60   AST  13 - 39 U/L 12 Low    CO2  21.0 - 31 mEq/L 26.3   ALT  7 - 52 U/L 12   Anion Gap  6.2 - 14.7 mEq/L 8.7   Resulting Agency [facility]     ef Range & Units 4/4/24 10:06 AM   Vit D, 25-Hydroxy  30.0 - 100 ng/mL 43.8     ef Range & Units 12/1/23 11:36 AM   WBC  4.00 - 11 10*3/uL 6.20   RBC  3.63 - 5.0 10*6/uL 5.11 High    Hgb  12.0 - 15 g/dL 14.9   Hematocrit  34.7 - 45 % 46.3 High    MCV  80.0 - 100 fL 90.7   MCH  26.0 - 34 pg 29.1   MCHC  32.5 - 35 g/dL 32.1 Low    RDW  11.9 - 15 % 13.6   Platelets  150 - 450 10*3/uL 252   MPV  6.8 - 10.2 fL 8.8   Neutrophils %  43.0 - 82 % 51.7   Lymphocytes %  14.5 - 45 % 36.1   Monocytes %  4.3 - 13.3 % 6.6   Eosinophils %  0.1 - 6.8 % 4.8   Basophils %  0.0 - 2.0 % 0.9   ABSOLUTE NEUTROPHIL COUNT  1.7 - 7.7 10*3/uL 3.2   ABSOLUTE NEUTROPHIL COUNT  /uL 3,205   ABSOLUTE LYMPHOCYTES  0.6 - 3.4 10*3/uL 2.2   Monocytes Absolute  0.3 - 1.0 10*3/uL 0.4    Eosinophils Absolute  0.0 - 0.5 10*3/uL 0.3   Basophils Absolute  0.0 - 0.2 10*3/uL 0.1   Resulting Agency [facility]     Specimen Collected: 12/01/23 11:36 AM     Ping4 Results Release      Assessment & Plan:      58-year-old woman comes in for further evaluation for likely gouty arthritis right ankle swelling 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  She is open to getting baseline x-ray of the lumbar spine    Patient has moderate joint swelling of the right ankle   Patient does notice improvement on allopurinol 100 mg daily  Has persistent right ankle swelling we will proceed with MRI of the right ankle with without contrast to rule out inflammatory etiology such as seronegative RA tear infection etc.  ESR was quite elevated at 100 but patient also had a urine infection with positive cultures that was treated  Repeat ESR CRP CMP and uric acid levels  Proceed with stat MRI of the right ankle to further evaluate right ankle swelling and pain  X-rays of the hands and feet showing degenerative arthritis  discussed the importance of remaining on allopurinol consistently for goal uric acid level less than 6    Patient does respond to steroids but will hold off until MRI is done to have more definitive treatment long-term.  Given information on hydroxychloroquine and methotrexate and will determine further plan after MRI results are back    Patient agrees with this plan.  Will also get complete autoimmune workup and also get a new baseline DEXA scan    Discussed steroids and allopurinol discussed.    Likely do a telephone visit to discuss treatment plan once we have the results of the MRI and repeat ESR CRP and CMP  and uric acid level to see if allopurinol needs to be adjusted    He does feel overall improved    Education and counseling provided to patient.  Instructed patient to call my office or seek medical attention immediately if symptoms worsen. Risks and side effects of medications and diagnosis discussed in detail and patient was given written information on new prescribed medications.    Return to clinic:  Return in about 2 months (around 11/4/2024).    Nedra Delgado MD  8/8/2024

## 2024-09-06 ENCOUNTER — HOSPITAL ENCOUNTER (OUTPATIENT)
Dept: MRI IMAGING | Facility: HOSPITAL | Age: 59
Discharge: HOME | End: 2024-09-06
Attending: INTERNAL MEDICINE
Payer: COMMERCIAL

## 2024-09-06 DIAGNOSIS — M1A.00X0 GOUTY ARTHROPATHY, CHRONIC, WITHOUT TOPHI: ICD-10-CM

## 2024-09-06 DIAGNOSIS — M15.0 PRIMARY OSTEOARTHRITIS INVOLVING MULTIPLE JOINTS: ICD-10-CM

## 2024-09-06 DIAGNOSIS — M25.471 RIGHT ANKLE SWELLING: ICD-10-CM

## 2024-09-06 PROCEDURE — 73723 MRI JOINT LWR EXTR W/O&W/DYE: CPT | Performed by: INTERNAL MEDICINE

## 2024-09-06 PROCEDURE — A9575 INJ GADOTERATE MEGLUMI 0.1ML: HCPCS | Performed by: INTERNAL MEDICINE

## 2024-09-06 RX ORDER — GADOTERATE MEGLUMINE 376.9 MG/ML
15 INJECTION INTRAVENOUS
Status: COMPLETED | OUTPATIENT
Start: 2024-09-06 | End: 2024-09-06

## 2024-09-06 RX ADMIN — GADOTERATE MEGLUMINE 12 ML: 376.9 INJECTION INTRAVENOUS at 16:37:00

## 2024-09-09 DIAGNOSIS — M1A.00X0 GOUTY ARTHROPATHY, CHRONIC, WITHOUT TOPHI: Primary | ICD-10-CM

## 2024-09-09 DIAGNOSIS — M25.471 RIGHT ANKLE SWELLING: ICD-10-CM

## 2024-09-09 NOTE — TELEPHONE ENCOUNTER
FINDINGS:    JOINT COMPARTMENTS:  There is a well-defined cystic lesion with sclerotic border within the medial talus measuring 8 x 5 mm without evidence of displacement.  There is moderate surrounding edema within the talus.  There is overlying cartilage thinning of   the medial talar dome.  This finding is consistent with an osteochondral lesion.  There is mild irregularity of the medial malleolus which could be indicative of prior trauma.  There is a small tibiotalar joint effusion.  Post-contrast imaging of the  bones demonstrates enhancement in the area of edema of the medial talus.  There is mild enhancement of the synovium of the tibiotalar joint especially posteriorly.       There is degenerative changes of the articulation of the lateral cuneiform with the 3rd metatarsal.  There is mild degenerative changes of the cuboid with the 4th and 5th metatarsal.  There is mild degenerative changes of the middle cuneiform with the  2nd metatarsal.  MUSCLULATURE:  No strain, edema, or atrophy. No abnormal enhancement.  TENDONS:  Achilles, lateral peroneal tendons, medial and anterior tendons are intact without significant tendinosis or tears.  No Achilles paratendinitis.  No abnormal enhancement.  LIGAMENTS:  Syndesmotic ligaments, ATF, PTF, CF, deltoid, and spring ligaments are intact without significant sprain.  SINUS TARSI:  Normal fat signal.  Cervical and Interosseous ligaments are unremarkable.  No abnormal enhancement.  PLANTAR FASCIA:  Central and lateral cords are intact.  No fasciitis identified.  No abnormal enhancement.                    Impression  CONCLUSION:       1. There is a osteochondral lesion of the talar dome with significant edema but no evidence of loose fragment.     2. There is no evidence of osteomyelitis.     3. Mild degenerative changes of the mid tarsal joints.    There is an osteochondral lesion of the talar dome with edema    Small joint effusion and synovial thickening    Based on  this I would like to discuss a trial of hydroxychloroquine and do a televisit.  I would like her to start low-dose Medrol 4 mg daily in the meantime to bring the swelling down and give her more comfort.  But I would like her to get the repeat labs inflammation markers done as soon as possible before starting the steroids.  I would like her to read about hydroxychloroquine before our televisit.  I given her information last visit    I do not know what this lesion is she would need to see an orthopedic foot specialist    I will like to refer her Dr. Kahn for his opinion unless she already has an orthopedic surgeon sports medicine doctor I would like them to take a look at this whether or not she needs to get this biopsied    Can you please remind patient to get the labs done to see if her inflammation markers have come down

## 2024-09-11 ENCOUNTER — TELEPHONE (OUTPATIENT)
Dept: RHEUMATOLOGY | Facility: CLINIC | Age: 59
End: 2024-09-11

## 2024-09-11 DIAGNOSIS — M25.471 RIGHT ANKLE SWELLING: ICD-10-CM

## 2024-09-11 DIAGNOSIS — M1A.00X0 GOUTY ARTHROPATHY, CHRONIC, WITHOUT TOPHI: Primary | ICD-10-CM

## 2024-09-13 RX ORDER — METHYLPREDNISOLONE 4 MG/1
4 TABLET ORAL DAILY
Qty: 30 TABLET | Refills: 0 | Status: SHIPPED | OUTPATIENT
Start: 2024-09-13

## 2024-09-13 NOTE — TELEPHONE ENCOUNTER
Spoke to patient regarding the below message:    FINDINGS:    JOINT COMPARTMENTS:  There is a well-defined cystic lesion with sclerotic border within the medial talus measuring 8 x 5 mm without evidence of displacement.  There is moderate surrounding edema within the talus.  There is overlying cartilage thinning of   the medial talar dome.  This finding is consistent with an osteochondral lesion.  There is mild irregularity of the medial malleolus which could be indicative of prior trauma.  There is a small tibiotalar joint effusion.  Post-contrast imaging of the  bones demonstrates enhancement in the area of edema of the medial talus.  There is mild enhancement of the synovium of the tibiotalar joint especially posteriorly.       There is degenerative changes of the articulation of the lateral cuneiform with the 3rd metatarsal.  There is mild degenerative changes of the cuboid with the 4th and 5th metatarsal.  There is mild degenerative changes of the middle cuneiform with the  2nd metatarsal.  MUSCLULATURE:  No strain, edema, or atrophy. No abnormal enhancement.  TENDONS:  Achilles, lateral peroneal tendons, medial and anterior tendons are intact without significant tendinosis or tears.  No Achilles paratendinitis.  No abnormal enhancement.  LIGAMENTS:  Syndesmotic ligaments, ATF, PTF, CF, deltoid, and spring ligaments are intact without significant sprain.  SINUS TARSI:  Normal fat signal.  Cervical and Interosseous ligaments are unremarkable.  No abnormal enhancement.  PLANTAR FASCIA:  Central and lateral cords are intact.  No fasciitis identified.  No abnormal enhancement.                    Impression  CONCLUSION:       1. There is a osteochondral lesion of the talar dome with significant edema but no evidence of loose fragment.     2. There is no evidence of osteomyelitis.     3. Mild degenerative changes of the mid tarsal joints.     There is an osteochondral lesion of the talar dome with edema     Small  joint effusion and synovial thickening     Based on this I would like to discuss a trial of hydroxychloroquine and do a televisit.  I would like her to start low-dose Medrol 4 mg daily in the meantime to bring the swelling down and give her more comfort.  But I would like her to get the repeat labs inflammation markers done as soon as possible before starting the steroids.  I would like her to read about hydroxychloroquine before our televisit.  I given her information last visit     I do not know what this lesion is she would need to see an orthopedic foot specialist     I will like to refer her Dr. Kahn for his opinion unless she already has an orthopedic surgeon sports medicine doctor I would like them to take a look at this whether or not she needs to get this biopsied     Can you please remind patient to get the labs done to see if her inflammation markers have come down        Patient verbalized understanding and stated that she will read about HCQ. She was scheduled for a televisit on Monday Sept 16 th at noon. She is going to go and have the lab work done today so  will have the results for the call on Monday. She states that she will  the Medrol and take it after her lab draw today. She states that she does have an Orthopaedic foot specialist that she sees so she will make an apt to be seen by them to determine if the lesion should be biopsied or not.     Medrol pended below for your approval

## 2024-09-14 ENCOUNTER — LAB ENCOUNTER (OUTPATIENT)
Dept: LAB | Age: 59
End: 2024-09-14
Attending: INTERNAL MEDICINE
Payer: COMMERCIAL

## 2024-09-14 DIAGNOSIS — M1A.00X0 GOUTY ARTHROPATHY, CHRONIC, WITHOUT TOPHI: ICD-10-CM

## 2024-09-14 LAB
ALBUMIN SERPL-MCNC: 4.4 G/DL (ref 3.2–4.8)
ALBUMIN/GLOB SERPL: 1.3 {RATIO} (ref 1–2)
ALP LIVER SERPL-CCNC: 85 U/L
ALT SERPL-CCNC: 9 U/L
ANION GAP SERPL CALC-SCNC: 9 MMOL/L (ref 0–18)
AST SERPL-CCNC: 12 U/L (ref ?–34)
BILIRUB SERPL-MCNC: 1 MG/DL (ref 0.3–1.2)
BUN BLD-MCNC: 9 MG/DL (ref 9–23)
CALCIUM BLD-MCNC: 9.8 MG/DL (ref 8.7–10.4)
CHLORIDE SERPL-SCNC: 104 MMOL/L (ref 98–112)
CO2 SERPL-SCNC: 28 MMOL/L (ref 21–32)
CREAT BLD-MCNC: 0.84 MG/DL
CRP SERPL-MCNC: 0.6 MG/DL (ref ?–0.5)
EGFRCR SERPLBLD CKD-EPI 2021: 80 ML/MIN/1.73M2 (ref 60–?)
ERYTHROCYTE [SEDIMENTATION RATE] IN BLOOD: 46 MM/HR
FASTING STATUS PATIENT QL REPORTED: NO
GLOBULIN PLAS-MCNC: 3.4 G/DL (ref 2–3.5)
GLUCOSE BLD-MCNC: 75 MG/DL (ref 70–99)
OSMOLALITY SERPL CALC.SUM OF ELEC: 289 MOSM/KG (ref 275–295)
POTASSIUM SERPL-SCNC: 3 MMOL/L (ref 3.5–5.1)
PROT SERPL-MCNC: 7.8 G/DL (ref 5.7–8.2)
SODIUM SERPL-SCNC: 141 MMOL/L (ref 136–145)
URATE SERPL-MCNC: 7.3 MG/DL

## 2024-09-14 PROCEDURE — 85652 RBC SED RATE AUTOMATED: CPT

## 2024-09-14 PROCEDURE — 84550 ASSAY OF BLOOD/URIC ACID: CPT

## 2024-09-14 PROCEDURE — 36415 COLL VENOUS BLD VENIPUNCTURE: CPT

## 2024-09-14 PROCEDURE — 86140 C-REACTIVE PROTEIN: CPT

## 2024-09-14 PROCEDURE — 80053 COMPREHEN METABOLIC PANEL: CPT

## 2024-09-16 RX ORDER — ALLOPURINOL 100 MG/1
200 TABLET ORAL DAILY
Qty: 60 TABLET | Refills: 3 | Status: SHIPPED | OUTPATIENT
Start: 2024-09-16

## 2024-09-16 NOTE — TELEPHONE ENCOUNTER
Virtual Telephone Check-In    Talon Deasis verbally consents to a Virtual/Telephone Check-In visit on 09/16/24.  Patient has been referred to the [facility] website at [email or web address] to review the yearly Consent to Treat document.    Patient understands and accepts financial responsibility for any deductible, co-insurance and/or co-pays associated with this service.    Duration of the service: 20 minutes      Virtual Telephone Check-In     Talon Deasis verbally consents to a Virtual/Telephone Check-In visit on 09/16/24.  Patient has been referred to the [facility] website at [email or web address] to review the yearly Consent to Treat document.     Patient understands and accepts financial responsibility for any deductible, co-insurance and/or co-pays associated with this service.     Duration of the service: 20 minutes           Component  Ref Range & Units 9/14/24 11:48 AM   Uric Acid  3.1 - 7.8 mg/dL 7.3      omponent  Ref Range & Units 9/14/24 11:48 AM   C-Reactive Protein  <=0.50 mg/dL 0.60 High       Discussed with patient results of MRI still showing soft tissue swelling and infusion CRP improving but still elevated uric acid level still elevated 7.3     We have discussed increasing allopurinol to 200 mg daily along with a Medrol Dosepak     I have discussed consideration of palindromic rheumatism and seronegative RA if patient continues to have swelling despite goal uric acid level less than 6     Placed orders to check CMP and uric acid levels in 4 weeks     If patient is seen in 8 weeks and still has swelling we will add on hydroxychloroquine as a neck step.  Risk side effects of hydroxychloroquine discussed in detail today including rare retinal toxicity all her concerns were addressed.     Advised her because of her sensitivity to higher doses of Medrol to consider 4 mg of Medrol while she increases her allopurinol to avoid flareup her GI doctor did EGD which showed gastritis she is on PPI at this time as  well     Advised her to take the steroids with food     She will let us know if she has any issues we may need to consider colchicine if she cannot tolerate this     I have discussed there is a lesion on MRI which is an osteochondral lesion we are hoping this is more prominent only because of the swelling if pain continues and swelling continues we will refer her to orthopedic surgeon to further look at this     Patient verbalized understanding and agrees with the plan.  Risk of allopurinol and prednisone discussed in detail        Summary of topics discussed:   PROCEDURE:  MRI ANKLE (W+WO), RIGHT (CPT=73723)     COMPARISON:  [facility], XR, XR FOOT, COMPLETE (MIN 3 VIEWS), LEFT (CPT=73630), 8/08/2024, 1:48 PM.     INDICATIONS:  M25.471 Right ankle swelling M15.0 Primary osteoarthritis involving multiple joints [id number].00X0 Gouty arthropathy, chronic, without tophi     TECHNIQUE:  Multiplanar imaging of the ankle is acquired including axial, sagittal and coronal imaging. Proton density, T2 weighted and fat suppression sequences are included. Exam was performed with and without intravenous gadolinium contrast.     PATIENT STATED HISTORY:(As transcribed by Technologist)  Patient complain of right foot pain and swelling. Rule out tear infection.      CONTRAST USED:  12 mL of Dotarem     FINDINGS:    JOINT COMPARTMENTS:  There is a well-defined cystic lesion with sclerotic border within the medial talus measuring 8 x 5 mm without evidence of displacement.  There is moderate surrounding edema within the talus.  There is overlying cartilage thinning of   the medial talar dome.  This finding is consistent with an osteochondral lesion.  There is mild irregularity of the medial malleolus which could be indicative of prior trauma.  There is a small tibiotalar joint effusion.  Post-contrast imaging of the  bones demonstrates enhancement in the area of edema of the medial talus.  There is mild enhancement of the synovium of the  tibiotalar joint especially posteriorly.       There is degenerative changes of the articulation of the lateral cuneiform with the 3rd metatarsal.  There is mild degenerative changes of the cuboid with the 4th and 5th metatarsal.  There is mild degenerative changes of the middle cuneiform with the  2nd metatarsal.  MUSCLULATURE:  No strain, edema, or atrophy. No abnormal enhancement.  TENDONS:  Achilles, lateral peroneal tendons, medial and anterior tendons are intact without significant tendinosis or tears.  No Achilles paratendinitis.  No abnormal enhancement.  LIGAMENTS:  Syndesmotic ligaments, ATF, PTF, CF, deltoid, and spring ligaments are intact without significant sprain.  SINUS TARSI:  Normal fat signal.  Cervical and Interosseous ligaments are unremarkable.  No abnormal enhancement.  PLANTAR FASCIA:  Central and lateral cords are intact.  No fasciitis identified.  No abnormal enhancement.                     Impression   CONCLUSION:       1. There is a osteochondral lesion of the talar dome with significant edema but no evidence of loose fragment.     2. There is no evidence of osteomyelitis.     3. Mild degenerative changes of the mid tarsal joints.        LOCATION:  [facility]        Dictated by (CST): Sanjay Bradshaw MD on 9/06/2024 at 5:06 PM      Finalized by (CST): Sanjay Bradshaw MD on 9/06/2024 at 5:13 PM        Result History     MRI ANKLE (W+WO), RIGHT (CPT=73723) (Order #[id number]) on 9/6/2024 - Order Result History Report     Diagnoses and all orders for this visit:     Gouty arthropathy, chronic, without tophi  -     Cancel: Comp Metabolic Panel (14); Future  -     Cancel: Uric Acid; Future     Right ankle swelling     Lumbar paraspinal muscle spasm     Person with feared complaint in whom no diagnosis was made     Other orders  -     Discontinue: allopurinol 100 MG Oral Tab; Take 2 tablets (200 mg total) by mouth daily.        Return to clinic November 2024 as scheduled patient was advised to get labs  again in 4 weeks after dose increase of allopurinol patient agrees with this plan will hold off on Plaquenil for now     Nedra Delgado MD       Summary of topics discussed:       Diagnoses and all orders for this visit:    Gouty arthropathy, chronic, without tophi    Right ankle swelling    Other orders  -     allopurinol 100 MG Oral Tab; Take 2 tablets (200 mg total) by mouth daily.          Nedra Delgado MD

## 2024-11-07 ENCOUNTER — OFFICE VISIT (OUTPATIENT)
Dept: RHEUMATOLOGY | Facility: CLINIC | Age: 59
End: 2024-11-07
Payer: COMMERCIAL

## 2024-11-07 VITALS
RESPIRATION RATE: 16 BRPM | TEMPERATURE: 98 F | HEART RATE: 58 BPM | BODY MASS INDEX: 24.27 KG/M2 | WEIGHT: 137 LBS | HEIGHT: 63 IN | OXYGEN SATURATION: 99 % | DIASTOLIC BLOOD PRESSURE: 80 MMHG | SYSTOLIC BLOOD PRESSURE: 110 MMHG

## 2024-11-07 DIAGNOSIS — M1A.00X0 GOUTY ARTHROPATHY, CHRONIC, WITHOUT TOPHI: ICD-10-CM

## 2024-11-07 DIAGNOSIS — M62.830 LUMBAR PARASPINAL MUSCLE SPASM: ICD-10-CM

## 2024-11-07 DIAGNOSIS — Z79.899 ENCOUNTER FOR DRUG THERAPY: ICD-10-CM

## 2024-11-07 DIAGNOSIS — M15.0 PRIMARY OSTEOARTHRITIS INVOLVING MULTIPLE JOINTS: Primary | ICD-10-CM

## 2024-11-07 PROBLEM — M25.471 RIGHT ANKLE SWELLING: Status: RESOLVED | Noted: 2024-09-04 | Resolved: 2024-11-07

## 2024-11-07 PROCEDURE — 3074F SYST BP LT 130 MM HG: CPT | Performed by: INTERNAL MEDICINE

## 2024-11-07 PROCEDURE — 3008F BODY MASS INDEX DOCD: CPT | Performed by: INTERNAL MEDICINE

## 2024-11-07 PROCEDURE — 3079F DIAST BP 80-89 MM HG: CPT | Performed by: INTERNAL MEDICINE

## 2024-11-07 PROCEDURE — 99214 OFFICE O/P EST MOD 30 MIN: CPT | Performed by: INTERNAL MEDICINE

## 2024-11-07 RX ORDER — METHYLPREDNISOLONE 4 MG/1
TABLET ORAL
Qty: 1 TABLET | Refills: 2 | Status: SHIPPED | OUTPATIENT
Start: 2024-11-07

## 2024-11-07 RX ORDER — ALLOPURINOL 300 MG/1
300 TABLET ORAL DAILY
Qty: 30 TABLET | Refills: 5 | Status: SHIPPED | OUTPATIENT
Start: 2024-11-07

## 2024-11-07 NOTE — PATIENT INSTRUCTIONS
OSTEOARTHRITIS    Fast Facts    Though some of the joint changes are irreversible, most patients will not need joint replacement surgery.    OA symptoms (what you feel) can vary greatly among patients.    A rheumatologist can detect arthritis and prescribe the proper treatment. The goal of treatment in OA is to reduce pain and improve function.    Exercise is an important part of OA treatment, because it can decrease joint pain and improve function.    At present, there is no treatment that can reverse the damage of OA in the joints. Researchers are trying to find ways to slow or reverse this joint damage.    Osteoarthritis (also known as OA) is a common joint disease that most often affects middle-age to elderly people. It is commonly referred to as \"wear and tear\" of the joints, but we now know that OA is a disease of the entire joint, involving the cartilage, joint lining, ligaments, and bone. Although it is more common in older people, it is not really accurate to say that the joints are just \"wearing out.\" It is characterized by breakdown of the cartilage (the tissue that cushions the ends of the bones between joints), bony changes of the joints, deterioration of tendons and ligaments, and various degrees of inflammation of the joint lining (called the synovium).    This arthritis tends to occur in the hand joints, spine, hips, knees, and great toes. The lifetime risk of developing OA of the knee is about 46%, and the lifetime risk of developing OA of the hip is 25%, according to the [facility] Osteoarthritis Project, a long-term study from the [facility] and sponsored by the Centers for Disease Control and Prevention (often called the CDC) and the National Institutes of Health.    OA is a top cause of disability in older people. The goal of osteoarthritis treatment is to reduce pain and improve function. There is no cure for the disease, but some treatments attempt to slow disease  progression.         What is osteoarthritis?    OA is a frequently slowly progressive joint disease typically seen in middle-aged to elderly people. In osteoarthritis, the cartilage between the bones in the joint breaks down. This causes the affected bones to slowly get bigger. The joint cartilage often breaks down because of mechanical stress or biochemical changes within the body, causing the bone underneath to fail. OA can occur together with other types of arthritis, such as gout or rheumatoid arthritis.    OA tends to affect commonly used joints such as the hands and spine, and the weight-bearing joints such as the hips and knees. Symptoms include:    Joint pain and stiffness    Knobby swelling at the joint    Cracking or grinding noise with joint movement    Decreased function of the joint    How do you treat osteoarthritis?    There is no proven treatment yet that can reverse joint damage from OA. The goal of osteoarthritis treatment is to reduce pain and improve function of the affected joints. Most often, this is possible with a mixture of physical measures and drug therapy and, sometimes, surgery.    Physical measures: Weight loss and exercise are useful in OA. Excess weight puts stress on your knee joints and hips and low back. For every 10 pounds of weight you lose over 10 years, you can reduce the chance of developing knee OA by up to 50 percent. Exercise can improve your muscle strength, decrease joint pain and stiffness, and lower the chance of disability due to OA. Also helpful are support (\"assistive\") devices, such as orthotics or a walking cane, that help you do daily activities. Heat or cold therapy can help relieve OA symptoms for a short time.    Certain alternative treatments such as spa (hot tub), massage, and chiropractic manipulation can help relieve pain for a short time. They can be costly, though, and require repeated treatments. Also, the long-term benefits of these alternative  (sometimes called complementary or integrative) medicine treatments are unproven but are under study.    Drug therapy: Forms of drug therapy include topical, oral (by mouth) and injections (shots). You apply topical drugs directly on the skin over the affected joints. These medicines include capsaicin cream, lidocaine and diclofenac gel. Oral pain relievers such as acetaminophen are common first treatments. So are nonsteroidal anti-inflammatory drugs (often called NSAIDs), which decrease swelling and pain.    In 2010, the government (FDA) approved the use of duloxetine (Cymbalta) for chronic (long-term) musculoskeletal pain including from OA. This oral drug is not new. It also is in use for other health concerns, such as mood disorders, nerve pain and fibromyalgia.    Patients with more serious pain may need stronger medications, such as prescription narcotics.    Joint injections with corticosteroids (sometimes called cortisone shots) or with a form of lubricant called hyaluronic acid can give months of pain relief from OA. This lubricant is given in the knee, and these shots may help delay the need for a knee replacement by a few years in some patients.    Surgery: Surgical treatment becomes an option for severe cases. This includes when the joint has serious damage, or when medical treatment fails to relieve pain and you have major loss of function. Surgery may involve arthroscopy, repair of the joint done through small incisions (cuts). If the joint damage cannot be repaired, you may need a joint replacement.    Supplements: Many over-the-counter nutrition supplements have been used for osteoarthritis treatment. Most lack good research data to support their effectiveness and safety. Among the most widely used are calcium, vitamin D and omega-3 fatty acids. To ensure safety and avoid drug interactions, consult your doctor or pharmacist before using any of these supplements. This is especially true when you are  combining these supplements with prescribed

## 2024-11-07 NOTE — PROGRESS NOTES
[facility], [address] [place] [address]      Consult     Talon Prieto Patient Status:  No patient class for patient encounter    1965 MRN [id number]   Location [facility], [address] [place] [address] Attending No att. providers found   Hosp Day # 0 PCP Kenneth Pabon MD     Referring Provider: PCP    Reason for Consultation:   Component  Ref Range & Units 22  9:39 AM   Uric Acid  2.3 - 6.6 mg/dL 8.8 High    Comment: Patients treated with          Subjective:      Talon Prieto is a 58 year old female with around 15-20 years ago noticed slow progression of arthritic changes tips of DIP joints; stiffness pain     Has occasional swelling of hands (over use)     Patient was started on allopurinol 100mg 2 years ago but does not take every only with flareups.    Had recent swelling right ankle and alternates with left ankle. (Now consistently for 2 doses)    No colchicine; no oral steroids     Denies any pain in her shoulders, elbows , wrists or hands    Has issues with right knee pain (saw ortho and did cortisone shot; 2019)     2 weeks ago ; Had colonoscopy had perforation (polyp)     Colon perforation ([facility]) Dr Wells (did the procedure)     Dr Epstein did surgeon (took out some colon) and some appendix (ascending colon poly removed no cancer)     Buts Gejers syndrome (yearly colonscopy)     Occasional low back; hip pain neck    Denies any history of DVT PE TIA CVA seizures migraines or headaches    States no shortness of breath ,chest pain ,fevers ,chills    States no urinary or bowel symptoms; bloody stools    States no headaches jaw pain, vision changes    States no history of pericardial, pleural effusions    States no significant dry eyes or dry mouth (mild)     States no history of uveitis iritis scleritis    States no history of Raynaud's or digital ulcerations    States no major weight changes; night sweats    Her weight has been stable    States no history of  photosensitive or malar rash.    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     Takes NSAIDS sometimes now just tylenol ; supportive ; 25 year old daughter stays with them. (Working from home)     Who was seen as a new patient August 8, 2024    Diagnosed with gouty arthritis started on allopurinol 100 mg daily with a Medrol Dosepak    Swelling improved significantly in his wrist hands and feet but after he finished the steroids he had recurrence of right ankle swelling and pain    Patient also noted to have elevated ESR of 100 but also had urine cultures that were positive and treated with antibiotics subsequently    We have discussed repeating sed rate CRP CMP and uric acid levels today to make sure we are at goal less than 6    And proceeding with an MRI of the right ankle with without contrast to rule out tear infection or other etiology or inflammatory etiology such as seronegative RA    Patient was seen again September 4, 2024    MRI showed no fracture or tendon issue but soft tissue swelling and tenosynovitis uric acid levels were elevated to 7.4 allopurinol was increased to 200 mg daily    And was given a another Medrol Dosepak with resolution of her symptoms stiffness and swelling however recent labs showed normal CMP and uric acid levels a 7.4 she does admit to not following a strict diet and she does eat red meats and bone marrow she states she will try to be better about watching her diet and low purine rich diet moving forward but she states no flareups of the gout since being on allopurinol with the dose increase    She is open to increasing to 300 mg daily to bring her at goal less than 6 to avoid future flareups with a Medrol Dosepak.    She is doing well otherwise with no other complaints no side effects to treatment    History/Other:      Past Medical  History:History reviewed. No pertinent past medical history.     Past Surgical History:   Past Surgical History:   Procedure Laterality Date    Excis tendon sheath lesn,hand/fingr Left 11/10/2015    Procedure: EXCISION MASS/CYST  FINGER;  Surgeon: Jesus Myles MD;  Location: [facility]    Revise median n/carpal tunnel surg Left 11/10/2015    Procedure: CARPAL TUNNEL RELEASE;  Surgeon: Jesus Myles MD;  Location: [facility]    Upper gi endoscopy, w/ esophagus/stomach & duodenum &/o         Social History:  reports that she has never smoked. She has never used smokeless tobacco. She reports that she does not currently use alcohol. She reports that she does not use drugs.    Family History: History reviewed. No pertinent family history.    Allergies:   Allergies   Allergen Reactions    Hydrocodone      Side effect only, causes nausea pt prefers not to have.       Current Medications:  Current Outpatient Medications   Medication Sig Dispense Refill    allopurinol 300 MG Oral Tab Take 1 tablet (300 mg total) by mouth daily. 30 tablet 5    methylPREDNISolone (MEDROL) 4 MG Oral Tablet Therapy Pack As directed. 1 tablet 2    amLODIPine 5 MG Oral Tab Take 1 tablet (5 mg total) by mouth daily.      Bimatoprost 0.03 % Ophthalmic Solution Place into both eyes nightly.      SIMBRINZA 1-0.2 % Ophthalmic Suspension 1 drop by Each eye route 2 (two) times daily.      olmesartan 20 MG Oral Tab Take 1 tablet (20 mg total) by mouth daily.      Metoprolol Succinate ER (TOPROL XL) 100 MG Oral Tablet 24 Hr Take 1 tablet (100 mg total) by mouth daily.      B Complex-C (SUPER B COMPLEX OR) Take by mouth.      MELATONIN OR Take  by mouth.      Ergocalciferol (VITAMIN D OR) Take  by mouth.      methylPREDNISolone (MEDROL) 4 MG Oral Tab Take 1 tablet (4 mg total) by mouth daily. (Patient not taking: Reported on 11/7/2024) 30 tablet 0    Omega-3 Fatty Acids (FISH OIL OR) Take 1 tablet by mouth daily. (Patient not taking: Reported on  11/7/2024)        No current facility-administered medications for this visit.     (Not in a hospital admission)      Review of Systems:     Constitutional: Negative for chills, , fatigue, fever and unexpected weight change.    HENT: Negative for congestion, and mouth sores.    Eyes: Negative for photophobia, pain, redness and visual disturbance.    Respiratory: Negative for apnea, cough, chest tightness, shortness of breath, wheezing and stridor.    Cardiovascular: Negative for chest pain, palpitations and leg swelling.    Gastrointestinal: Negative for abdominal distention, abdominal pain, blood in stool, constipation, diarrhea and nausea.    Endocrine: Negative.     Genitourinary: Negative for decreased urine volume, difficulty urinating, dyspareunia, dysuria, flank pain, and frequency.    Musculoskeletal: + arthralgias, no gait problem and joint swelling.    Skin: Negative for color change, pallor and rash. No raynauds or digital ulcerations no sclerodactly.    Allergic/Immunologic: Negative.    Neurological: Negative for dizziness, tremors, seizures, syncope, speech difficulty, weakness, light-headedness, numbness and headaches.    Hematological: Does not bruise/bleed easily.    Psychiatric/Behavioral: Negative for confusion, decreased concentration, hallucinations, self-injury, +sleep disturbance and no suicidal ideas or depression.    Objective:   Vitals:    11/07/24 1143   BP: 110/80   Pulse: 58   Resp: 16   Temp: 98 °F (36.7 °C)          Constitutional: is oriented to person, place, and time. Appears well-developed and well-nourished. No distress.    HEENT: Normocephalic; EOMI; no jvd; no LAD; no oral or nasal ulcers.     Eyes: Conjunctivae and EOM are normal. Pupils are equal, round, and reactive to light.     Neck: Normal range of motion. No thyromegaly present.    Cardiovascular: RRR, no murmurs.    Lungs: Clear, Bilateral air entry, no wheezes.    Abdominal: Soft.    Musculoskeletal:    There is  currently no information documented on the homunculus. Go to the Rheumatology activity and complete the [facility] joint exam.     Joint Exam 11/07/2024     No joint exam has been documented for this visit        Swollen: --     Tender: --         Right shoulder: Exhibits normal range of motion on abduction and internal rotation, no tenderness, no bony tenderness, no deformity, no laceration, no pain and no spasm.        Left shoulder: Exhibits normal range of motion on abduction and internal and external rotation.  no tenderness, no bony tenderness, no swelling, no effusion, no deformity, no pain, no spasm and normal strength.        Right elbow:  Exhibits normal range of motion, no swelling, no effusion and no deformity. No tenderness found. No medial epicondyle, no lateral epicondyle and no olecranon process tenderness noted. There are no contractures or tophi or nodules.        Left elbow:  Normal range of motion, no swelling, no effusion and no deformity. No medial epicondyle, no lateral epicondyle and no olecranon process tenderness noted. There are no contractures or tophi or nodules.        Right wrist:  Exhibits normal range of motion, no tenderness, no bony tenderness, no swelling, no effusion and no crepitus. Flexion and extension intact w/o limitation.        Left wrist: Exhibits normal range of motion, no tenderness, no bony tenderness, no swelling, no effusion, no crepitus and no deformity. Flexion and extension intact without limitation.        Right hip: Exhibits normal range of motion, normal strength, no tenderness, no bony tenderness, no swelling and no crepitus.        Right hand: No synovitis of MCP,PIP or DIP joints; there are small Bouchards extensive scattered enlarged Heberden nodules noted flexion deformities;  strength: 100%.  Mild squaring first CMC joint        Left hand: No synovitis of MCP,PIP or DIP joints; no Bouchards extensive enlarged Heberden nodules noted with flexion  deformities at the DIP joint;  strength: 100%.  Mild squaring of first CMC joint joint        Left hip: Exhibits normal range of motion, normal strength, no tenderness, no bony tenderness, No swelling and no crepitus.        Right knee: Exhibits normal range of motion, no swelling, no effusion, no ecchymosis, no deformity and no erythema. No tenderness found. No medial joint line, no lateral joint line, no MCL and no LCL tenderness noted. mod crepitation on flexion of knee and extension normal.        Left knee:  Exhibits normal range of motion, no swelling, no effusion, no ecchymosis and no erythema. No tenderness found. No medial joint line, no lateral joint line and no patellar tendon tenderness noted. mod crepitation on flexion of the knee. Extension intact and normal.        Right ankle: Moderate soft tissue swelling, mild warmth limitation range of motion flexion plantarflexion because of the swelling and edema no deformity. +tenderness.         Left ankle: Exhibits no swelling. No tenderness. No lateral malleolus and no medial malleolus tenderness found. Achilles tendon normal. Achilles tendon exhibits no pain, no defect and normal Renteria's test results.  Dorsiflexion and plantar flexion intact without limitation in range of motion.        Cervical back: Exhibits normal range of motion, no tenderness, no bony tenderness, no swelling, no pain and no spasm.        Thoracic back: Exhibits normal range of motion, no tenderness, no bony tenderness and no spasm.        Lumbar back:  Exhibits normal range of motion, no tenderness, no bony tenderness, no pain and + spasm.        Right foot: normal. There is normal range of motion, no tenderness, no bony tenderness, no crepitus and no laceration. There is no synovitis or tenderness of the MTP joints to palpation.  Bony enlargement of the MTP joints with mild soft tissue swelling        Left foot: normal. There is normal range of motion, no tenderness, no bony  tenderness and no crepitus. There is no synovitis or tenderness of the MTP joints to palpation.  Bony enlargement of the MTP joints.  Bony enlargement MTP joints    Lymphadenopathy: No submental, no submandibular, and no occipital adenopathy present, has no cervical adenopathy or axillary lympadenopathy.    Neurological: Alert and oriented. No focal motor or sensory abnormalities. Strength is 5/5 Upper Extremities/Lower Extremities proximally and distally.    Skin: Skin is warm, dry and intact.  No rashes no purpuric petechial lesion    Psychiatric: Normal behavior.    Results:    Labs:      No results found for: \"WBC\", \"RBC\", \"HGB\", \"HCT\", \"MCV\", \"MCH\", \"MCHC\", \"RDW\", \"PLT\", \"MPV\", \"LYMPHOCYTES\", \"NEUT\"    No components found for: \"RELY\", \"NMET\", \"MYEL\", \"PROMY\", \"MARYAM\", \"ABSNEUTS\", \"ABSBANDS\", \"ABMM\", \"ABMY\", \"ABPM\", \"ABBL\"      Lab Results   Component Value Date     09/14/2024    K 3.0 (L) 09/14/2024    CO2 28.0 09/14/2024    BUN 9 09/14/2024    ALB 4.4 09/14/2024    AST 12 09/14/2024    ALT 9 (L) 09/14/2024          No components found for: \"ESRWESTERGRN\"       Lab Results   Component Value Date    CRP 0.60 (H) 09/14/2024         Lab Results   Component Value Date    CLARITY Turbid (A) 08/08/2024    UROBILINOGEN Normal 08/08/2024     @LABRCNTIP(RF,B12)@      [unfilled]    Imaging:  MRI ANKLE (W+WO), RIGHT (CPT=73723)    Result Date: 9/6/2024  CONCLUSION:   1. There is a osteochondral lesion of the talar dome with significant edema but no evidence of loose fragment.  2. There is no evidence of osteomyelitis.  3. Mild degenerative changes of the mid tarsal joints.   LOCATION:  [facility]   Dictated by (CST): Sanjay Bradshaw MD on 9/06/2024 at 5:06 PM     Finalized by (CST): Sanjay Bradshaw MD on 9/06/2024 at 5:13 PM        Anatomical Region Laterality Modality   Lower Extremities right Digital Radiography   Knee -- --     Narrative    CLINICAL INDICATION: PAIN IN RT KNEE  COMPARISON: None  TECHNIQUE: Bilateral AP  standing, PA skiers, and patellar sundown view of the  knees were obtained. A single lateral view of the right knee was also performed  FINDINGS:  Right knee: There is no acute fracture. No dislocation. Alignment is  satisfactory. There is prominent bony spurring along the superior margin of the  patellar facet. Otherwise Minimal bony spurring most prominent in the medial  compartment. Cortex is intact. Patella is not subluxed. Small suprapatellar  joint effusion. No intramedullary abnormality. Lateral patellofemoral joint  space narrowing and bony spurring is also present.  Left knee: There is no acute fracture. No dislocation. Minimal bony spurring  most prominent in the medial compartment. Cortex is intact. No intramedullary  abnormality. Bony spurring along the lateral patellar facet.  IMPRESSION:  1.  No acute osseous abnormality within bilateral knees.  2.  Mild degenerative changes within the right knee and minimal degenerative  changes of the left knee.  Dictating Vivian Holly  Dictated 07/21/2022 14:45  Signing Vivian Holly  Left ankle 3 views and left foot 3 views.  HISTORY: Left foot and ankle pain. M 25.572.  Opinion:  No acute appearing fracture or bony lesion. No dislocation. Spurs are seen at  the plantar and posterior aspect of the calcaneus. Hallux valgus deformity is  noted with mild spurring and joint space loss at the MP joint of the great toe.  Dictating Quan Bray  Dictated 03/18/2021 15:07  Signing Quan Bray  [place] [address]                      Transcribed by:  ZULEMA                                       03/18/21 15:09  Signed by:  QUAN ACOSTA MD                          03/18/21 15:09                                     [facility] [address] [place], [place]    NAME: DEASIS,VINH                                                  /AGE/SEX: 1965 - 53 - F    PHYSICIAN: Kenneth Pabon M.D.                                    ADMIT DATE:    UNIT #: [id number]                                                DIS DATE:    ACCT #: [id number]                                              LOC//BED: [facility]-                                                [facility]                                      RAD/XR BONE DENSITY : [id number]                                           ORDER DATE: 03/15/19                                           REPORT # : [id number]    Exam: Bone Densitometry        Indication: Postmenopausal screening.        Comparison: None.        Technical Factors: Utilizing dual-isotope imaging technique the spine and hip were evaluated.        Findings:  On AP projection of the lumbar spine the bone mineral density is 1.082g/sq cm  corresponding with a T-score value of 0.3. This is within normal limits and there is no increased  fracture risk.        In the femoral neck the bone mineral density is 0.792 g/sq cm corresponding with a T-score value of  -0.5. This is also within normal limits and there is no increased fracture risk.        Impression:        Bone mineral density is within normal limits and there is no increased fracture risk.        DICTATED: Wei Marx M.D.      <Electronically signed by Wei aMrx M.D. in OV>          Wei Marx M.D.          03/15/19 1540    DRAFT UNTIL SIGNED     AMINATION: MRI of the right foot without IV contrast   CLINICAL INDICATION: Suspect fifth metatarsal stress fracture   COMPARISON: 2018 left foot radiograph   MRI of the right foot was performed with sagittal T1 and STIR sequences and   axial and coronal T2 fat-saturated and T1-weighted sequences.   FINDINGS: There is bone marrow edema surrounding the nondisplaced,   intra-articular fracture of the lateral aspect of the second metatarsal base.   There is soft tissue edema in the Lisfranc joint;  however, the Lisfranc ligament   is not disrupted. There is surrounding soft tissue edema. The flexor and   extensor tendons are intact. The abductor hallucis and abductor digit minimi   tendons are intact.   There are bone marrow contusions in the third and fourth metatarsal bases as   well as the intermediate and lateral cuneiforms.   IMPRESSION: Bone marrow contusions along the second through fourth   tarsometatarsal articulation and nondisplaced intra-articular fracture of the   second metatarsal base. Correlation with CT scan would be helpful for further   evaluation.   Dictating Ramy Pierre   Dictated 12/31/2018 16:48   Signing Ramy Pierre   Location [place] [address]                          Component  Ref Range & Units 4/4/24 10:06 AM   Sodium  133 - 144 mEq/L 140   Potassium  3.5 - 5.2 mEq/L 4.0   Chloride  98 - 107 mEq/L 105   Glucose  70 - 99 mg/dL 109 High    BUN  7 - 25 mg/dL 14   Creatinine  0.6 - 1.2 mg/dL 0.8   Calcium  8.6 - 10.3 mg/dL 9.1   Total Protein  6.4 - 8.9 g/dL 6.9   Albumin  3.5 - 5.7 g/dL 3.9   Total Bilirubin  0.3 - 1.0 mg/dL 1.0   Alkaline Phosphatase  34 - 104 U/L 60   AST  13 - 39 U/L 12 Low    CO2  21.0 - 31 mEq/L 26.3   ALT  7 - 52 U/L 12   Anion Gap  6.2 - 14.7 mEq/L 8.7   Resulting Agency [facility]     ef Range & Units 4/4/24 10:06 AM   Vit D, 25-Hydroxy  30.0 - 100 ng/mL 43.8     ef Range & Units 12/1/23 11:36 AM   WBC  4.00 - 11 10*3/uL 6.20   RBC  3.63 - 5.0 10*6/uL 5.11 High    Hgb  12.0 - 15 g/dL 14.9   Hematocrit  34.7 - 45 % 46.3 High    MCV  80.0 - 100 fL 90.7   MCH  26.0 - 34 pg 29.1   MCHC  32.5 - 35 g/dL 32.1 Low    RDW  11.9 - 15 % 13.6   Platelets  150 - 450 10*3/uL 252   MPV  6.8 - 10.2 fL 8.8   Neutrophils %  43.0 - 82 % 51.7   Lymphocytes %  14.5 - 45 % 36.1   Monocytes %  4.3 - 13.3 % 6.6   Eosinophils %  0.1 - 6.8 % 4.8   Basophils %  0.0 - 2.0 % 0.9   ABSOLUTE NEUTROPHIL COUNT  1.7 - 7.7 10*3/uL 3.2   ABSOLUTE NEUTROPHIL  COUNT  /uL 3,205   ABSOLUTE LYMPHOCYTES  0.6 - 3.4 10*3/uL 2.2   Monocytes Absolute  0.3 - 1.0 10*3/uL 0.4   Eosinophils Absolute  0.0 - 0.5 10*3/uL 0.3   Basophils Absolute  0.0 - 0.2 10*3/uL 0.1   Resulting Agency [facility]     Specimen Collected: 12/01/23 11:36 AM     [employer] Results Release      Assessment & Plan:      58-year-old woman comes in for further evaluation for likely gouty arthritis right ankle swelling 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      No further synovitis in exam  Unfortunately uric acid bumped to 7.4 will increase allopurinol to 300 mg daily with Medrol Dosepak    Repeat CMP uric acid levels in 4-week    Goal uric acid level less than 6 patient agrees with this plan    MRI showed no tear or infection or other etiology    X-rays of the hands and feet showing degenerative arthritis    discussed the importance of remaining on allopurinol consistently for goal uric acid level less than 6    Hold off on further immunotherapy at this time unless she develops swelling after we reach goal uric acid level less than 6    Patient agrees with this plan.  Will also get complete autoimmune workup and also get a new baseline DEXA scan    Discussed steroids and allopurinol discussed.      Education and counseling provided to patient.  Instructed patient to call my office or seek medical attention immediately if symptoms worsen. Risks and side effects of medications and diagnosis discussed in detail and patient was given written information on new prescribed medications.    Return to clinic:  Return in about 4 months (around 3/7/2025).    Nedra Delgado MD  8/8/2024

## 2025-03-12 ENCOUNTER — LAB ENCOUNTER (OUTPATIENT)
Dept: LAB | Age: 60
End: 2025-03-12
Attending: INTERNAL MEDICINE
Payer: COMMERCIAL

## 2025-03-12 ENCOUNTER — OFFICE VISIT (OUTPATIENT)
Dept: RHEUMATOLOGY | Facility: CLINIC | Age: 60
End: 2025-03-12
Payer: COMMERCIAL

## 2025-03-12 VITALS
DIASTOLIC BLOOD PRESSURE: 90 MMHG | HEART RATE: 89 BPM | SYSTOLIC BLOOD PRESSURE: 140 MMHG | WEIGHT: 145 LBS | TEMPERATURE: 98 F | HEIGHT: 63 IN | RESPIRATION RATE: 16 BRPM | BODY MASS INDEX: 25.69 KG/M2 | OXYGEN SATURATION: 97 %

## 2025-03-12 DIAGNOSIS — M1A.00X0 GOUTY ARTHROPATHY, CHRONIC, WITHOUT TOPHI: ICD-10-CM

## 2025-03-12 DIAGNOSIS — Z79.899 ENCOUNTER FOR DRUG THERAPY: ICD-10-CM

## 2025-03-12 DIAGNOSIS — M54.16 LUMBAR RADICULITIS: ICD-10-CM

## 2025-03-12 DIAGNOSIS — M15.0 PRIMARY OSTEOARTHRITIS INVOLVING MULTIPLE JOINTS: ICD-10-CM

## 2025-03-12 DIAGNOSIS — M62.830 LUMBAR PARASPINAL MUSCLE SPASM: ICD-10-CM

## 2025-03-12 DIAGNOSIS — M15.0 PRIMARY OSTEOARTHRITIS INVOLVING MULTIPLE JOINTS: Primary | ICD-10-CM

## 2025-03-12 LAB
ALBUMIN SERPL-MCNC: 4.8 G/DL (ref 3.2–4.8)
ALBUMIN/GLOB SERPL: 1.8 {RATIO} (ref 1–2)
ALP LIVER SERPL-CCNC: 70 U/L
ALT SERPL-CCNC: 21 U/L
ANION GAP SERPL CALC-SCNC: 11 MMOL/L (ref 0–18)
AST SERPL-CCNC: 20 U/L (ref ?–34)
BILIRUB SERPL-MCNC: 1.1 MG/DL (ref 0.2–1.1)
BUN BLD-MCNC: 15 MG/DL (ref 9–23)
CALCIUM BLD-MCNC: 9.7 MG/DL (ref 8.7–10.6)
CHLORIDE SERPL-SCNC: 105 MMOL/L (ref 98–112)
CO2 SERPL-SCNC: 27 MMOL/L (ref 21–32)
CREAT BLD-MCNC: 1.18 MG/DL
CRP SERPL-MCNC: <0.4 MG/DL (ref ?–0.5)
EGFRCR SERPLBLD CKD-EPI 2021: 53 ML/MIN/1.73M2 (ref 60–?)
ERYTHROCYTE [SEDIMENTATION RATE] IN BLOOD: 27 MM/HR
FASTING STATUS PATIENT QL REPORTED: YES
GLOBULIN PLAS-MCNC: 2.7 G/DL (ref 2–3.5)
GLUCOSE BLD-MCNC: 100 MG/DL (ref 70–99)
OSMOLALITY SERPL CALC.SUM OF ELEC: 297 MOSM/KG (ref 275–295)
POTASSIUM SERPL-SCNC: 3.8 MMOL/L (ref 3.5–5.1)
PROT SERPL-MCNC: 7.5 G/DL (ref 5.7–8.2)
SODIUM SERPL-SCNC: 143 MMOL/L (ref 136–145)
URATE SERPL-MCNC: 6.1 MG/DL

## 2025-03-12 PROCEDURE — 86140 C-REACTIVE PROTEIN: CPT

## 2025-03-12 PROCEDURE — 84550 ASSAY OF BLOOD/URIC ACID: CPT

## 2025-03-12 PROCEDURE — 80053 COMPREHEN METABOLIC PANEL: CPT

## 2025-03-12 PROCEDURE — 85652 RBC SED RATE AUTOMATED: CPT

## 2025-03-12 PROCEDURE — 99214 OFFICE O/P EST MOD 30 MIN: CPT | Performed by: INTERNAL MEDICINE

## 2025-03-12 PROCEDURE — 3077F SYST BP >= 140 MM HG: CPT | Performed by: INTERNAL MEDICINE

## 2025-03-12 PROCEDURE — 36415 COLL VENOUS BLD VENIPUNCTURE: CPT

## 2025-03-12 PROCEDURE — 3080F DIAST BP >= 90 MM HG: CPT | Performed by: INTERNAL MEDICINE

## 2025-03-12 PROCEDURE — 3008F BODY MASS INDEX DOCD: CPT | Performed by: INTERNAL MEDICINE

## 2025-03-12 RX ORDER — ALLOPURINOL 300 MG/1
300 TABLET ORAL DAILY
Qty: 30 TABLET | Refills: 5 | Status: SHIPPED | OUTPATIENT
Start: 2025-03-12

## 2025-03-12 NOTE — PATIENT INSTRUCTIONS
OSTEOARTHRITIS    Fast Facts    Though some of the joint changes are irreversible, most patients will not need joint replacement surgery.    OA symptoms (what you feel) can vary greatly among patients.    A rheumatologist can detect arthritis and prescribe the proper treatment. The goal of treatment in OA is to reduce pain and improve function.    Exercise is an important part of OA treatment, because it can decrease joint pain and improve function.    At present, there is no treatment that can reverse the damage of OA in the joints. Researchers are trying to find ways to slow or reverse this joint damage.    Osteoarthritis (also known as OA) is a common joint disease that most often affects middle-age to elderly people. It is commonly referred to as \"wear and tear\" of the joints, but we now know that OA is a disease of the entire joint, involving the cartilage, joint lining, ligaments, and bone. Although it is more common in older people, it is not really accurate to say that the joints are just \"wearing out.\" It is characterized by breakdown of the cartilage (the tissue that cushions the ends of the bones between joints), bony changes of the joints, deterioration of tendons and ligaments, and various degrees of inflammation of the joint lining (called the synovium).    This arthritis tends to occur in the hand joints, spine, hips, knees, and great toes. The lifetime risk of developing OA of the knee is about 46%, and the lifetime risk of developing OA of the hip is 25%, according to the [facility] Osteoarthritis Project, a long-term study [facility] and sponsored by the Centers for Disease Control and Prevention (often called the CDC) and the National Institutes of Health.    OA is a top cause of disability in older people. The goal of osteoarthritis treatment is to reduce pain and improve function. There is no cure for the disease, but some treatments attempt to slow disease  progression.         What is osteoarthritis?    OA is a frequently slowly progressive joint disease typically seen in middle-aged to elderly people. In osteoarthritis, the cartilage between the bones in the joint breaks down. This causes the affected bones to slowly get bigger. The joint cartilage often breaks down because of mechanical stress or biochemical changes within the body, causing the bone underneath to fail. OA can occur together with other types of arthritis, such as gout or rheumatoid arthritis.    OA tends to affect commonly used joints such as the hands and spine, and the weight-bearing joints such as the hips and knees. Symptoms include:    Joint pain and stiffness    Knobby swelling at the joint    Cracking or grinding noise with joint movement    Decreased function of the joint    How do you treat osteoarthritis?    There is no proven treatment yet that can reverse joint damage from OA. The goal of osteoarthritis treatment is to reduce pain and improve function of the affected joints. Most often, this is possible with a mixture of physical measures and drug therapy and, sometimes, surgery.    Physical measures: Weight loss and exercise are useful in OA. Excess weight puts stress on your knee joints and hips and low back. For every 10 pounds of weight you lose over 10 years, you can reduce the chance of developing knee OA by up to 50 percent. Exercise can improve your muscle strength, decrease joint pain and stiffness, and lower the chance of disability due to OA. Also helpful are support (\"assistive\") devices, such as orthotics or a walking cane, that help you do daily activities. Heat or cold therapy can help relieve OA symptoms for a short time.    Certain alternative treatments such as spa (hot tub), massage, and chiropractic manipulation can help relieve pain for a short time. They can be costly, though, and require repeated treatments. Also, the long-term benefits of these alternative  (sometimes called complementary or integrative) medicine treatments are unproven but are under study.    Drug therapy: Forms of drug therapy include topical, oral (by mouth) and injections (shots). You apply topical drugs directly on the skin over the affected joints. These medicines include capsaicin cream, lidocaine and diclofenac gel. Oral pain relievers such as acetaminophen are common first treatments. So are nonsteroidal anti-inflammatory drugs (often called NSAIDs), which decrease swelling and pain.    In 2010, the government (FDA) approved the use of duloxetine (Cymbalta) for chronic (long-term) musculoskeletal pain including from OA. This oral drug is not new. It also is in use for other health concerns, such as mood disorders, nerve pain and fibromyalgia.    Patients with more serious pain may need stronger medications, such as prescription narcotics.    Joint injections with corticosteroids (sometimes called cortisone shots) or with a form of lubricant called hyaluronic acid can give months of pain relief from OA. This lubricant is given in the knee, and these shots may help delay the need for a knee replacement by a few years in some patients.    Surgery: Surgical treatment becomes an option for severe cases. This includes when the joint has serious damage, or when medical treatment fails to relieve pain and you have major loss of function. Surgery may involve arthroscopy, repair of the joint done through small incisions (cuts). If the joint damage cannot be repaired, you may need a joint replacement.    Supplements: Many over-the-counter nutrition supplements have been used for osteoarthritis treatment. Most lack good research data to support their effectiveness and safety. Among the most widely used are calcium, vitamin D and omega-3 fatty acids. To ensure safety and avoid drug interactions, consult your doctor or pharmacist before using any of these supplements. This is especially true when you are  combining these supplements with prescribed

## 2025-03-12 NOTE — PROGRESS NOTES
[facility], [address] [place] [address]      Consult     Talon Prieto Patient Status:  No patient class for patient encounter    1965 MRN [id number]   Location [facility], [address] [place] [address] Attending No att. providers found   Hosp Day # 0 PCP Kenneth Pabon MD     Referring Provider: PCP    Reason for Consultation: Gouty arthritis; osteoarthritis      Component  Ref Range & Units 22  9:39 AM   Uric Acid  2.3 - 6.6 mg/dL 8.8 High    Comment: Patients treated with      Component  Ref Range & Units 10/11/24  8:19 AM   Uric Acid  2.3 - 6.6 mg/dL 7.7 High          Subjective:      Talon Prieto is a 59 year old female with around 15-20 years ago noticed slow progression of arthritic changes tips of DIP joints; stiffness pain     Was seen as a new patient 2024    Diagnosed with gouty arthritis started on allopurinol 100 mg daily with a Medrol Dosepak    Swelling improved significantly in his wrist hands and feet but after he finished the steroids he had recurrence of right ankle swelling and pain    Patient also noted to have elevated ESR of 100 but also had urine cultures that were positive and treated with antibiotics subsequently    We have discussed repeating sed rate CRP CMP and uric acid levels today to make sure we are at goal less than 6    And proceeding with an MRI of the right ankle with without contrast to rule out tear infection or other etiology or inflammatory etiology such as seronegative RA    Patient was seen again 2024    MRI showed no fracture or tendon issue but soft tissue swelling and tenosynovitis uric acid levels were elevated to 7.4 allopurinol was increased to 200 mg daily    And was given a another Medrol Dosepak with resolution of her symptoms stiffness and swelling however recent labs showed normal CMP and uric acid levels a 7.4 she does admit to not following a strict diet and she does eat red meats and bone  marrow she states she will try to be better about watching her diet and low purine rich diet moving forward but she states no flareups of the gout since being on allopurinol with the dose increase    Was seen again November 2024    Continued on 300 mg daily which was increased for hyperuricemia    Not had any flareups since last visits    Increased back pain and some radicular symptoms of Left lower extremity and neuropathy    Seeing her PCP who gave her a muscle relaxant she is not interested in x-rays or MRI yet denies any urinary bowel incontinence or weakness she is open to physical therapy in the meantime she is to see her PCP again in 4 months    Symptoms worsen she will let us know she is open to updating CMP and uric acid levels today to monitor for gout levels goal less than 6    She has no other concern      History/Other:      Past Medical History:History reviewed. No pertinent past medical history.     Past Surgical History:   Past Surgical History:   Procedure Laterality Date    Excis tendon sheath lesn,hand/fingr Left 11/10/2015    Procedure: EXCISION MASS/CYST  FINGER;  Surgeon: Jesus Myles MD;  Location: [facility]    Revise median n/carpal tunnel surg Left 11/10/2015    Procedure: CARPAL TUNNEL RELEASE;  Surgeon: Jesus Myles MD;  Location: [facility]    Upper gi endoscopy, w/ esophagus/stomach & duodenum &/o         Social History:  reports that she has never smoked. She has never used smokeless tobacco. She reports that she does not currently use alcohol. She reports that she does not use drugs.    Family History: History reviewed. No pertinent family history.    Allergies:   Allergies   Allergen Reactions    Hydrocodone      Side effect only, causes nausea pt prefers not to have.       Current Medications:  Current Outpatient Medications   Medication Sig Dispense Refill    Multiple Vitamins-Minerals (CENTRUM SILVER 50+WOMEN OR) Take 1 tablet by mouth daily.      allopurinol 300 MG Oral Tab  Take 1 tablet (300 mg total) by mouth daily. 30 tablet 5    amLODIPine 5 MG Oral Tab Take 1 tablet (5 mg total) by mouth daily.      Bimatoprost 0.03 % Ophthalmic Solution Place into both eyes nightly.      SIMBRINZA 1-0.2 % Ophthalmic Suspension 1 drop by Each eye route 2 (two) times daily.      olmesartan 20 MG Oral Tab Take 1 tablet (20 mg total) by mouth daily. As needed      Metoprolol Succinate ER (TOPROL XL) 100 MG Oral Tablet 24 Hr Take 1 tablet (100 mg total) by mouth daily.      MELATONIN OR Take  by mouth.      Ergocalciferol (VITAMIN D OR) Take  by mouth. (Patient not taking: Reported on 3/12/2025)        No current facility-administered medications for this visit.     (Not in a hospital admission)      Review of Systems:     Constitutional: Negative for chills, , fatigue, fever and unexpected weight change.    HENT: Negative for congestion, and mouth sores.    Eyes: Negative for photophobia, pain, redness and visual disturbance.    Respiratory: Negative for apnea, cough, chest tightness, shortness of breath, wheezing and stridor.    Cardiovascular: Negative for chest pain, palpitations and leg swelling.    Gastrointestinal: Negative for abdominal distention, abdominal pain, blood in stool, constipation, diarrhea and nausea.    Endocrine: Negative.     Genitourinary: Negative for decreased urine volume, difficulty urinating, dyspareunia, dysuria, flank pain, and frequency.    Musculoskeletal: + arthralgias, no gait problem and joint swelling.    Skin: Negative for color change, pallor and rash. No raynauds or digital ulcerations no sclerodactly.    Allergic/Immunologic: Negative.    Neurological: Negative for dizziness, tremors, seizures, syncope, speech difficulty, weakness, light-headedness, numbness and headaches.    Hematological: Does not bruise/bleed easily.    Psychiatric/Behavioral: Negative for confusion, decreased concentration, hallucinations, self-injury, +sleep disturbance and no suicidal  ideas or depression.    Objective:   Vitals:    03/12/25 1027   BP: 140/90   Pulse: 89   Resp: 16   Temp: 98.2 °F (36.8 °C)          Constitutional: is oriented to person, place, and time. Appears well-developed and well-nourished. No distress.    HEENT: Normocephalic; EOMI; no jvd; no LAD; no oral or nasal ulcers.     Eyes: Conjunctivae and EOM are normal. Pupils are equal, round, and reactive to light.     Neck: Normal range of motion. No thyromegaly present.    Cardiovascular: RRR, no murmurs.    Lungs: Clear, Bilateral air entry, no wheezes.    Abdominal: Soft.    Musculoskeletal:         Joint Exam 03/12/2025        Right  Left   Sacroiliac   Tender   Tender   Hip      Tender        Swollen: 0      Tender: 3          Right shoulder: Exhibits normal range of motion on abduction and internal rotation, no tenderness, no bony tenderness, no deformity, no laceration, no pain and no spasm.        Left shoulder: Exhibits normal range of motion on abduction and internal and external rotation.  no tenderness, no bony tenderness, no swelling, no effusion, no deformity, no pain, no spasm and normal strength.        Right elbow:  Exhibits normal range of motion, no swelling, no effusion and no deformity. No tenderness found. No medial epicondyle, no lateral epicondyle and no olecranon process tenderness noted. There are no contractures or tophi or nodules.        Left elbow:  Normal range of motion, no swelling, no effusion and no deformity. No medial epicondyle, no lateral epicondyle and no olecranon process tenderness noted. There are no contractures or tophi or nodules.        Right wrist:  Exhibits normal range of motion, no tenderness, no bony tenderness, no swelling, no effusion and no crepitus. Flexion and extension intact w/o limitation.        Left wrist: Exhibits normal range of motion, no tenderness, no bony tenderness, no swelling, no effusion, no crepitus and no deformity. Flexion and extension intact without  limitation.        Right hip: Exhibits normal range of motion, normal strength, no tenderness, no bony tenderness, no swelling and no crepitus.        Right hand: No synovitis of MCP,PIP or DIP joints; there are small Bouchards extensive scattered enlarged Heberden nodules noted flexion deformities;  strength: 100%.  Mild squaring first CMC joint        Left hand: No synovitis of MCP,PIP or DIP joints; no Bouchards extensive enlarged Heberden nodules noted with flexion deformities at the DIP joint;  strength: 100%.  Mild squaring of first CMC joint joint        Left hip: Exhibits normal range of motion, normal strength, no tenderness, no bony tenderness, No swelling and no crepitus.        Right knee: Exhibits normal range of motion, no swelling, no effusion, no ecchymosis, no deformity and no erythema. No tenderness found. No medial joint line, no lateral joint line, no MCL and no LCL tenderness noted. mod crepitation on flexion of knee and extension normal.        Left knee:  Exhibits normal range of motion, no swelling, no effusion, no ecchymosis and no erythema. No tenderness found. No medial joint line, no lateral joint line and no patellar tendon tenderness noted. mod crepitation on flexion of the knee. Extension intact and normal.        Right ankle: Moderate soft tissue swelling, mild warmth limitation range of motion flexion plantarflexion because of the swelling and edema no deformity. +tenderness.         Left ankle: Exhibits no swelling. No tenderness. No lateral malleolus and no medial malleolus tenderness found. Achilles tendon normal. Achilles tendon exhibits no pain, no defect and normal Renteria's test results.  Dorsiflexion and plantar flexion intact without limitation in range of motion.        Cervical back: Exhibits normal range of motion, no tenderness, no bony tenderness, no swelling, no pain and no spasm.        Thoracic back: Exhibits normal range of motion, no tenderness, no bony  tenderness and no spasm.        Lumbar back:  Exhibits normal range of motion, no tenderness, no bony tenderness, no pain and + spasm.        Right foot: normal. There is normal range of motion, no tenderness, no bony tenderness, no crepitus and no laceration. There is no synovitis or tenderness of the MTP joints to palpation.  Bony enlargement of the MTP joints with mild soft tissue swelling        Left foot: normal. There is normal range of motion, no tenderness, no bony tenderness and no crepitus. There is no synovitis or tenderness of the MTP joints to palpation.  Bony enlargement of the MTP joints.  Bony enlargement MTP joints    Lymphadenopathy: No submental, no submandibular, and no occipital adenopathy present, has no cervical adenopathy or axillary lympadenopathy.    Neurological: Alert and oriented. No focal motor or sensory abnormalities. Strength is 5/5 Upper Extremities/Lower Extremities proximally and distally.    Skin: Skin is warm, dry and intact.  No rashes no purpuric petechial lesion    Psychiatric: Normal behavior.    Results:    Labs:      No results found for: \"WBC\", \"RBC\", \"HGB\", \"HCT\", \"MCV\", \"MCH\", \"MCHC\", \"RDW\", \"PLT\", \"MPV\", \"LYMPHOCYTES\", \"NEUT\"    No components found for: \"RELY\", \"NMET\", \"MYEL\", \"PROMY\", \"MARYAM\", \"ABSNEUTS\", \"ABSBANDS\", \"ABMM\", \"ABMY\", \"ABPM\", \"ABBL\"      Lab Results   Component Value Date     09/14/2024    K 3.0 (L) 09/14/2024    CO2 28.0 09/14/2024    BUN 9 09/14/2024    ALB 4.4 09/14/2024    AST 12 09/14/2024    ALT 9 (L) 09/14/2024          No components found for: \"ESRWESTERGRN\"       Lab Results   Component Value Date    CRP 0.60 (H) 09/14/2024         Lab Results   Component Value Date    CLARITY Turbid (A) 08/08/2024    UROBILINOGEN Normal 08/08/2024     @LABRCNTIP(RF,B12)@      [unfilled]    Imaging:  No results found.    Anatomical Region Laterality Modality   Lower Extremities right Digital Radiography   Knee -- --     Narrative    CLINICAL INDICATION:  PAIN IN RT KNEE  COMPARISON: None  TECHNIQUE: Bilateral AP standing, PA skiers, and patellar sundown view of the  knees were obtained. A single lateral view of the right knee was also performed  FINDINGS:  Right knee: There is no acute fracture. No dislocation. Alignment is  satisfactory. There is prominent bony spurring along the superior margin of the  patellar facet. Otherwise Minimal bony spurring most prominent in the medial  compartment. Cortex is intact. Patella is not subluxed. Small suprapatellar  joint effusion. No intramedullary abnormality. Lateral patellofemoral joint  space narrowing and bony spurring is also present.  Left knee: There is no acute fracture. No dislocation. Minimal bony spurring  most prominent in the medial compartment. Cortex is intact. No intramedullary  abnormality. Bony spurring along the lateral patellar facet.  IMPRESSION:  1.  No acute osseous abnormality within bilateral knees.  2.  Mild degenerative changes within the right knee and minimal degenerative  changes of the left knee.  Dictating Vivian Holly  Dictated 07/21/2022 14:45  Signing Vivian Holly  Left ankle 3 views and left foot 3 views.  HISTORY: Left foot and ankle pain. M 25.572.  Opinion:  No acute appearing fracture or bony lesion. No dislocation. Spurs are seen at  the plantar and posterior aspect of the calcaneus. Hallux valgus deformity is  noted with mild spurring and joint space loss at the MP joint of the great toe.  Dictating Yumiko Bray  Dictated 03/18/2021 15:07  Signing Yumiko Bray  [place] [id number]                      Transcribed by:  ZULEMA                                       03/18/21 15:09  Signed by:  YUMIKO ACOSTA MD                          03/18/21 15:09                                     [facility] [address] [place], [place]    NAME:  DEASIS,VINH SANTANA/AGE/SEX: 1965 - 53 - F    PHYSICIAN: Kenneth Pabon M.D.                                    ADMIT DATE:    UNIT #: [id number]                                                DIS DATE:    ACCT #: [id number]                                              LOC//BED: [id number]                                                [facility]                                      RAD/XR BONE DENSITY : [id number]                                           ORDER DATE: 03/15/19                                           REPORT # : [id number]    Exam: Bone Densitometry        Indication: Postmenopausal screening.        Comparison: None.        Technical Factors: Utilizing dual-isotope imaging technique the spine and hip were evaluated.        Findings:  On AP projection of the lumbar spine the bone mineral density is 1.082g/sq cm  corresponding with a T-score value of 0.3. This is within normal limits and there is no increased  fracture risk.        In the femoral neck the bone mineral density is 0.792 g/sq cm corresponding with a T-score value of  -0.5. This is also within normal limits and there is no increased fracture risk.        Impression:        Bone mineral density is within normal limits and there is no increased fracture risk.        DICTATED: Wei Marx M.D.      <Electronically signed by Wei Marx M.D. in OV>          Wei Marx M.D.          03/15/19 1540    DRAFT UNTIL SIGNED     AMINATION: MRI of the right foot without IV contrast   CLINICAL INDICATION: Suspect fifth metatarsal stress fracture   COMPARISON: 2018 left foot radiograph   MRI of the right foot was performed with sagittal T1 and STIR sequences and   axial and coronal T2 fat-saturated and T1-weighted sequences.   FINDINGS: There is bone marrow edema surrounding the nondisplaced,   intra-articular fracture of the lateral aspect of the second metatarsal  base.   There is soft tissue edema in the Lisfranc joint; however, the Lisfranc ligament   is not disrupted. There is surrounding soft tissue edema. The flexor and   extensor tendons are intact. The abductor hallucis and abductor digit minimi   tendons are intact.   There are bone marrow contusions in the third and fourth metatarsal bases as   well as the intermediate and lateral cuneiforms.   IMPRESSION: Bone marrow contusions along the second through fourth   tarsometatarsal articulation and nondisplaced intra-articular fracture of the   second metatarsal base. Correlation with CT scan would be helpful for further   evaluation.   Dictating Ramy Pierre   Dictated 12/31/2018 16:48   Signing Ramy Pierre   Location [place] [address]                          Component  Ref Range & Units 4/4/24 10:06 AM   Sodium  133 - 144 mEq/L 140   Potassium  3.5 - 5.2 mEq/L 4.0   Chloride  98 - 107 mEq/L 105   Glucose  70 - 99 mg/dL 109 High    BUN  7 - 25 mg/dL 14   Creatinine  0.6 - 1.2 mg/dL 0.8   Calcium  8.6 - 10.3 mg/dL 9.1   Total Protein  6.4 - 8.9 g/dL 6.9   Albumin  3.5 - 5.7 g/dL 3.9   Total Bilirubin  0.3 - 1.0 mg/dL 1.0   Alkaline Phosphatase  34 - 104 U/L 60   AST  13 - 39 U/L 12 Low    CO2  21.0 - 31 mEq/L 26.3   ALT  7 - 52 U/L 12   Anion Gap  6.2 - 14.7 mEq/L 8.7   Resulting Agency [facility]     ef Range & Units 4/4/24 10:06 AM   Vit D, 25-Hydroxy  30.0 - 100 ng/mL 43.8     ef Range & Units 12/1/23 11:36 AM   WBC  4.00 - 11 10*3/uL 6.20   RBC  3.63 - 5.0 10*6/uL 5.11 High    Hgb  12.0 - 15 g/dL 14.9   Hematocrit  34.7 - 45 % 46.3 High    MCV  80.0 - 100 fL 90.7   MCH  26.0 - 34 pg 29.1   MCHC  32.5 - 35 g/dL 32.1 Low    RDW  11.9 - 15 % 13.6   Platelets  150 - 450 10*3/uL 252   MPV  6.8 - 10.2 fL 8.8   Neutrophils %  43.0 - 82 % 51.7   Lymphocytes %  14.5 - 45 % 36.1   Monocytes %  4.3 - 13.3 % 6.6   Eosinophils %  0.1 - 6.8 % 4.8   Basophils %  0.0 - 2.0 % 0.9   ABSOLUTE  NEUTROPHIL COUNT  1.7 - 7.7 10*3/uL 3.2   ABSOLUTE NEUTROPHIL COUNT  /uL 3,205   ABSOLUTE LYMPHOCYTES  0.6 - 3.4 10*3/uL 2.2   Monocytes Absolute  0.3 - 1.0 10*3/uL 0.4   Eosinophils Absolute  0.0 - 0.5 10*3/uL 0.3   Basophils Absolute  0.0 - 0.2 10*3/uL 0.1   Resulting Agency [facility]     Specimen Collected: 12/01/23 11:36 AM     Entrec Results Release      Assessment & Plan:      59-year-old woman comes in for further evaluation for likely gouty arthritis right ankle swelling 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    Osteoarthritis multiple joint  Suspect gouty arthritis without tophi flareup of the right ankle  Osteoarthritis multiple joints  Suspect lumbar radiculitis    No further synovitis in exam    States no flareups    Continue allopurinol 300 mg daily repeat CMP uric acid levels make sure it is at goal less than 6    Goal uric acid level less than 6 patient agrees with this plan    MRI showed no tear or infection or other etiology    X-rays of the hands and feet showing degenerative arthritis    discussed the importance of remaining on allopurinol consistently for goal uric acid level less than 6    Hold off on further immunotherapy at this time unless she develops swelling after we reach goal uric acid level less than 6    Patient agrees with this plan.  Will also get complete autoimmune workup and also get a new baseline DEXA scan    Suspect lumbar radiculitis straight leg testing negative she would like to hold off on x-rays and MRI of the lumbar spine she is open to physical therapy her PCP has given her a muscle relaxant.  She does feel she is trending in the right direction she will see her PCP back again after 4 months.  Strongly encouraged proceeding physical therapy in the meantime and if she does not improve she will need x-rays and MRI as neck step in pain management referral      Education and counseling provided to patient.  Instructed patient to call my office or seek  medical attention immediately if symptoms worsen. Risks and side effects of medications and diagnosis discussed in detail and patient was given written information on new prescribed medications.    Return to clinic:  Return in about 6 months (around 9/12/2025).    Nedra Delgado MD  8/8/2024

## 2025-03-13 ENCOUNTER — TELEPHONE (OUTPATIENT)
Dept: RHEUMATOLOGY | Facility: CLINIC | Age: 60
End: 2025-03-13

## 2025-03-13 NOTE — TELEPHONE ENCOUNTER
Gap  0 - 18 mmol/L 11 9  4   BUN  9 - 23 mg/dL 15 9  12   Creatinine  0.55 - 1.02 mg/dL 1.18 High  0.84  0.93   Calcium, Total  8.7 - 10.6 mg/dL 9.7 9.8 R  9.7 R   Calculated Osmolality  275 - 295 mOsm/kg 297 High  289  283   eGFR-Cr  >=60 mL/min/1.73m2 53 Low  80           Kidney function is slightly elevated and GFR slightly low make sure she stays hydrated drinks plenty of fluids    Follow-up with PCP in regards to this next visit    Inflammation markers are now normal her uric acid levels are perfect range

## 2025-03-14 NOTE — TELEPHONE ENCOUNTER
Spoke to patient regarding the below message:                 Gap  0 - 18 mmol/L 11 9   4   BUN  9 - 23 mg/dL 15 9   12   Creatinine  0.55 - 1.02 mg/dL 1.18 High  0.84   0.93   Calcium, Total  8.7 - 10.6 mg/dL 9.7 9.8 R   9.7 R   Calculated Osmolality  275 - 295 mOsm/kg 297 High  289   283   eGFR-Cr  >=60 mL/min/1.73m2 53 Low  80               Kidney function is slightly elevated and GFR slightly low make sure she stays hydrated drinks plenty of fluids     Follow-up with PCP in regards to this next visit     Inflammation markers are now normal her uric acid levels are perfect range       Patient verbalized understanding and denied any questions at this time. She states that she will increase her fluid intake.